# Patient Record
Sex: MALE | Race: ASIAN | Employment: FULL TIME | URBAN - METROPOLITAN AREA
[De-identification: names, ages, dates, MRNs, and addresses within clinical notes are randomized per-mention and may not be internally consistent; named-entity substitution may affect disease eponyms.]

---

## 2017-12-04 ENCOUNTER — APPOINTMENT (OUTPATIENT)
Dept: CV DIAGNOSTICS | Facility: HOSPITAL | Age: 57
DRG: 247 | End: 2017-12-04
Attending: HOSPITALIST
Payer: COMMERCIAL

## 2017-12-04 ENCOUNTER — HOSPITAL ENCOUNTER (INPATIENT)
Facility: HOSPITAL | Age: 57
LOS: 2 days | Discharge: HOME | DRG: 247 | End: 2017-12-06
Attending: EMERGENCY MEDICINE | Admitting: HOSPITALIST
Payer: COMMERCIAL

## 2017-12-04 ENCOUNTER — APPOINTMENT (OUTPATIENT)
Dept: INTERVENTIONAL RADIOLOGY/VASCULAR | Facility: HOSPITAL | Age: 57
DRG: 247 | End: 2017-12-04
Attending: INTERNAL MEDICINE
Payer: COMMERCIAL

## 2017-12-04 ENCOUNTER — APPOINTMENT (OUTPATIENT)
Dept: GENERAL RADIOLOGY | Facility: HOSPITAL | Age: 57
DRG: 247 | End: 2017-12-04
Attending: EMERGENCY MEDICINE
Payer: COMMERCIAL

## 2017-12-04 DIAGNOSIS — R77.8 ELEVATED TROPONIN: ICD-10-CM

## 2017-12-04 DIAGNOSIS — R07.9 ACUTE CHEST PAIN: Primary | ICD-10-CM

## 2017-12-04 PROBLEM — R79.89 ELEVATED TROPONIN: Status: ACTIVE | Noted: 2017-12-04

## 2017-12-04 PROBLEM — Z95.820 S/P ANGIOPLASTY WITH STENT: Status: ACTIVE | Noted: 2017-12-04

## 2017-12-04 PROCEDURE — 3E073KZ INTRODUCTION OF OTHER DIAGNOSTIC SUBSTANCE INTO CORONARY ARTERY, PERCUTANEOUS APPROACH: ICD-10-PCS | Performed by: INTERNAL MEDICINE

## 2017-12-04 PROCEDURE — 027034Z DILATION OF CORONARY ARTERY, ONE ARTERY WITH DRUG-ELUTING INTRALUMINAL DEVICE, PERCUTANEOUS APPROACH: ICD-10-PCS | Performed by: INTERNAL MEDICINE

## 2017-12-04 PROCEDURE — 93306 TTE W/DOPPLER COMPLETE: CPT | Performed by: HOSPITALIST

## 2017-12-04 PROCEDURE — 71010 XR CHEST AP PORTABLE  (CPT=71010): CPT | Performed by: EMERGENCY MEDICINE

## 2017-12-04 PROCEDURE — B211YZZ FLUOROSCOPY OF MULTIPLE CORONARY ARTERIES USING OTHER CONTRAST: ICD-10-PCS | Performed by: INTERNAL MEDICINE

## 2017-12-04 PROCEDURE — 99223 1ST HOSP IP/OBS HIGH 75: CPT | Performed by: HOSPITALIST

## 2017-12-04 PROCEDURE — B215YZZ FLUOROSCOPY OF LEFT HEART USING OTHER CONTRAST: ICD-10-PCS | Performed by: INTERNAL MEDICINE

## 2017-12-04 PROCEDURE — B240ZZ3 ULTRASONOGRAPHY OF SINGLE CORONARY ARTERY, INTRAVASCULAR: ICD-10-PCS | Performed by: INTERNAL MEDICINE

## 2017-12-04 PROCEDURE — 4A023N7 MEASUREMENT OF CARDIAC SAMPLING AND PRESSURE, LEFT HEART, PERCUTANEOUS APPROACH: ICD-10-PCS | Performed by: INTERNAL MEDICINE

## 2017-12-04 RX ORDER — ATORVASTATIN CALCIUM 10 MG/1
10 TABLET, FILM COATED ORAL NIGHTLY
COMMUNITY
End: 2017-12-06

## 2017-12-04 RX ORDER — ATORVASTATIN CALCIUM 10 MG/1
10 TABLET, FILM COATED ORAL NIGHTLY
Status: DISCONTINUED | OUTPATIENT
Start: 2017-12-04 | End: 2017-12-04

## 2017-12-04 RX ORDER — SODIUM CHLORIDE 9 MG/ML
INJECTION, SOLUTION INTRAVENOUS CONTINUOUS
Status: DISCONTINUED | OUTPATIENT
Start: 2017-12-04 | End: 2017-12-04

## 2017-12-04 RX ORDER — NITROGLYCERIN 0.4 MG/1
0.4 TABLET SUBLINGUAL ONCE
Status: COMPLETED | OUTPATIENT
Start: 2017-12-04 | End: 2017-12-04

## 2017-12-04 RX ORDER — HEPARIN SODIUM 5000 [USP'U]/ML
INJECTION, SOLUTION INTRAVENOUS; SUBCUTANEOUS
Status: COMPLETED
Start: 2017-12-04 | End: 2017-12-04

## 2017-12-04 RX ORDER — ACETAMINOPHEN 500 MG
1000 TABLET ORAL EVERY 6 HOURS PRN
Status: DISCONTINUED | OUTPATIENT
Start: 2017-12-04 | End: 2017-12-06

## 2017-12-04 RX ORDER — OMEPRAZOLE 20 MG/1
20 CAPSULE, DELAYED RELEASE ORAL
COMMUNITY
End: 2018-01-02

## 2017-12-04 RX ORDER — LIDOCAINE HYDROCHLORIDE 10 MG/ML
INJECTION, SOLUTION INFILTRATION; PERINEURAL
Status: COMPLETED
Start: 2017-12-04 | End: 2017-12-04

## 2017-12-04 RX ORDER — MIDAZOLAM HYDROCHLORIDE 1 MG/ML
INJECTION INTRAMUSCULAR; INTRAVENOUS
Status: COMPLETED
Start: 2017-12-04 | End: 2017-12-04

## 2017-12-04 RX ORDER — ASPIRIN 325 MG
325 TABLET ORAL DAILY
Status: DISCONTINUED | OUTPATIENT
Start: 2017-12-04 | End: 2017-12-04

## 2017-12-04 RX ORDER — OLMESARTAN MEDOXOMIL 20 MG/1
5 TABLET ORAL
COMMUNITY
End: 2017-12-06

## 2017-12-04 RX ORDER — NITROGLYCERIN 0.4 MG/1
0.4 TABLET SUBLINGUAL EVERY 5 MIN PRN
Status: DISCONTINUED | OUTPATIENT
Start: 2017-12-04 | End: 2017-12-06

## 2017-12-04 RX ORDER — ACETAMINOPHEN AND CODEINE PHOSPHATE 300; 30 MG/1; MG/1
2 TABLET ORAL EVERY 4 HOURS PRN
Status: DISCONTINUED | OUTPATIENT
Start: 2017-12-04 | End: 2017-12-06

## 2017-12-04 RX ORDER — FENOFIBRATE 134 MG/1
134 CAPSULE ORAL
Status: DISCONTINUED | OUTPATIENT
Start: 2017-12-04 | End: 2017-12-06

## 2017-12-04 RX ORDER — ASPIRIN 81 MG/1
324 TABLET, CHEWABLE ORAL ONCE
Status: COMPLETED | OUTPATIENT
Start: 2017-12-04 | End: 2017-12-04

## 2017-12-04 RX ORDER — ACETAMINOPHEN AND CODEINE PHOSPHATE 300; 30 MG/1; MG/1
1 TABLET ORAL EVERY 4 HOURS PRN
Status: DISCONTINUED | OUTPATIENT
Start: 2017-12-04 | End: 2017-12-06

## 2017-12-04 RX ORDER — HEPARIN SODIUM AND DEXTROSE 10000; 5 [USP'U]/100ML; G/100ML
INJECTION INTRAVENOUS CONTINUOUS
Status: DISCONTINUED | OUTPATIENT
Start: 2017-12-04 | End: 2017-12-04

## 2017-12-04 RX ORDER — SODIUM CHLORIDE 9 MG/ML
INJECTION, SOLUTION INTRAVENOUS CONTINUOUS
Status: ACTIVE | OUTPATIENT
Start: 2017-12-04 | End: 2017-12-05

## 2017-12-04 RX ORDER — HEPARIN SODIUM 5000 [USP'U]/ML
30 INJECTION INTRAVENOUS; SUBCUTANEOUS ONCE
Status: COMPLETED | OUTPATIENT
Start: 2017-12-04 | End: 2017-12-04

## 2017-12-04 RX ORDER — HEPARIN SODIUM 5000 [USP'U]/ML
60 INJECTION INTRAVENOUS; SUBCUTANEOUS ONCE
Status: COMPLETED | OUTPATIENT
Start: 2017-12-04 | End: 2017-12-04

## 2017-12-04 RX ORDER — HEPARIN SODIUM AND DEXTROSE 10000; 5 [USP'U]/100ML; G/100ML
12 INJECTION INTRAVENOUS ONCE
Status: COMPLETED | OUTPATIENT
Start: 2017-12-04 | End: 2017-12-04

## 2017-12-04 RX ORDER — ACETAMINOPHEN 325 MG/1
650 TABLET ORAL EVERY 4 HOURS PRN
Status: DISCONTINUED | OUTPATIENT
Start: 2017-12-04 | End: 2017-12-06

## 2017-12-04 RX ORDER — PANTOPRAZOLE SODIUM 20 MG/1
20 TABLET, DELAYED RELEASE ORAL
Status: DISCONTINUED | OUTPATIENT
Start: 2017-12-04 | End: 2017-12-06

## 2017-12-04 RX ORDER — ASPIRIN 81 MG/1
324 TABLET, CHEWABLE ORAL ONCE
Status: DISCONTINUED | OUTPATIENT
Start: 2017-12-04 | End: 2017-12-04

## 2017-12-04 RX ORDER — ONDANSETRON 2 MG/ML
4 INJECTION INTRAMUSCULAR; INTRAVENOUS EVERY 4 HOURS PRN
Status: DISCONTINUED | OUTPATIENT
Start: 2017-12-04 | End: 2017-12-06

## 2017-12-04 RX ORDER — ATORVASTATIN CALCIUM 20 MG/1
20 TABLET, FILM COATED ORAL NIGHTLY
Status: DISCONTINUED | OUTPATIENT
Start: 2017-12-04 | End: 2017-12-06

## 2017-12-04 RX ORDER — LOSARTAN POTASSIUM 25 MG/1
25 TABLET ORAL DAILY
Status: DISCONTINUED | OUTPATIENT
Start: 2017-12-04 | End: 2017-12-06

## 2017-12-04 RX ORDER — ASPIRIN 81 MG/1
81 TABLET ORAL DAILY
Status: DISCONTINUED | OUTPATIENT
Start: 2017-12-05 | End: 2017-12-06

## 2017-12-04 RX ORDER — DEXTROSE MONOHYDRATE 25 G/50ML
50 INJECTION, SOLUTION INTRAVENOUS
Status: DISCONTINUED | OUTPATIENT
Start: 2017-12-04 | End: 2017-12-06

## 2017-12-04 RX ORDER — POTASSIUM CHLORIDE 20 MEQ/1
40 TABLET, EXTENDED RELEASE ORAL ONCE
Status: COMPLETED | OUTPATIENT
Start: 2017-12-04 | End: 2017-12-04

## 2017-12-04 NOTE — CONSULTS
[facility]    Report of Consultation    Stephentownrobyn Patiño Patient Status:  Inpatient    1960 MRN [id number]   [facility] 2NE-A Attending Freeman Gross MD   Hosp Day # 0 PCP Triny Ibarra MD     Date of Admission:  2017  Elder 200-3,000 Units/hr, Intravenous, Continuous  •  metoprolol tartrate (LOPRESSOR) partial tablet 12.5 mg, 12.5 mg, Oral, 2x Daily(Beta Blocker)  •  atorvastatin (LIPITOR) tab 10 mg, 10 mg, Oral, Nightly  •  fenofibrate micronized (LOFIBRA) cap 134 mg, 134 mg kg)  12/22/16 : 145 lb (65.8 kg)      Telemetry: sinus  General: Alert and oriented in no apparent distress. HEENT: No focal deficits. Neck: No JVD  Cardiac: Regular rate and rhythm, S1, S2 normal, no murmur, rub or gallop.   Lungs: Clear without wheezes,

## 2017-12-04 NOTE — PROGRESS NOTES
IM addendum to Dr. Caitlin Vera H&P:    Pt seen and examined. No acute events, denies chest pain, dyspnea.     /77 (BP Location: Right arm)   Pulse 65   Temp 98 °F (36.7 °C) (Oral)   Resp 23   Ht 5' 6\" (1.676 m)   Wt 153 lb 3.5 oz (69.5 kg)   SpO2 100%

## 2017-12-04 NOTE — H&P
[facility]  History and Physical     Celia Emerson Patient Status:  Emergency    1960 MRN [id number]   Location [address] [facility] Attending Rosette Mckeon MD   Hosp Day # 0 PCP Marilyn Argueta MD     Chief Complaint: Kiarajuan david Kaye Choline Fenofibrate (TRILIPIX) 135 MG Oral Capsule Delayed Release 1 CAPSULE DAILY Disp:  Rfl:    Esomeprazole Magnesium (NEXIUM) 20 MG Oral Capsule Delayed Release 1 CAPSULE DAILY Disp:  Rfl:    Rosuvastatin Calcium (CRESTOR) 10 MG Oral Tab 1 TABLET SHOSHANA bb  2. HTN  1. ARB  2. Start BB  3. DM II- Hyperglycemia protocol  4. DL-statin  5. GERD-PPI  6. CKD-suspect secondary to diabetes and hypertension  1. Monitor closely  2.  Gentle hydration prior to angiogram    Quality:  · DVT Prophylaxis: Heparin drip  ·

## 2017-12-04 NOTE — ED PROVIDER NOTES
Patient Seen in: [facility] Emergency Department    History   Patient presents with:  Chest Pain Angina (cardiovascular)    Stated Complaint: chest pain    HPI    44-year-old male comes in the hospital the chief complaint of having difficulty with p or masses noted  Back nontender without CVA tenderness  Extremity no clubbing, cyanosis or edema noted.   Full range of motion noted without tenderness  Neuro: No focal deficits noted    ED Course     Labs Reviewed   COMP METABOLIC PANEL (14) - Abnormal; No unstable angina patient be admitted for further care at this time.   ED Course as of Dec 04 0413  ------------------------------------------------------------       [facility]           Admission disposition: 12/4/2017  4:13 AM           Disposition and Plan     C

## 2017-12-04 NOTE — PLAN OF CARE
NURSING ADMISSION NOTE      Patient admitted via Cart  Oriented to room. Safety precautions initiated. Bed in low position. Call light in reach. Pt admitted from ER with chest pain. Wife at bedside. A&Ox4. Room air.  NSR on tele, denying cardiovas

## 2017-12-04 NOTE — PAYOR COMM NOTE
--------------  ADMISSION REVIEW     Payor: [employer] PPO  Subscriber #:  [id number]  Authorization Number: [id number]    Admit date: 12/4/17  Admit time: 2968       Admitting Physician: Dione Akins MD  Attending Physician:  Dione Akins MD  Primary Care Phys Temp 97.9 °F (36.6 °C) (Temporal)   Resp 15   Ht 167.6 cm (5' 6\")   Wt 68 kg   SpO2 96%   BMI 24.21 kg/m²[MP.2]     Physical Exam  HEENT : NCAT, EOMI, PEERL, throat clear, neck supple, no JVD, trachea midline, No LAD  Heart: S1S2 normal. No murmurs, regul troponin[MP. 2]    Disposition:[MP.1]  Admit  12/4/2017  4:13 am[MP. 2]    Present on Admission  Date Reviewed: 12/6/2013          ICD-10-CM Noted POA    Acute chest pain R07.9 12/4/2017 Unknown[MP.2]        Ubaldo Merino MD on 12/4/2017  4:14 AM      H&P medications on file prior to encounter.    Current Outpatient Prescriptions on File Prior to Encounter:  Aspirin 81 MG Oral Tab 1 TABLET DAILY Disp:  Rfl:    Choline Fenofibrate (TRILIPIX) 135 MG Oral Capsule Delayed Release 1 CAPSULE DAILY Disp:  Rfl:    E troponin >1[MD.1]. Currently chest pain has subsided[MD.2]  1. Heparin drip  2. Cardio consult[MD.1]  3. Check subsequent troponin[MD.3]  4. ECHO  5. ASA[MD.1], nitro, bb[MD.2]  2. HTN  1. ARB  2. Start BB  3. DM II- Hyperglycemia protocol  4.  DL-statin micronized (LOFIBRA) cap 134 mg     Date Action Dose Route User    12/4/2017 0830 Given 134 mg Oral Nat Mendenhall, RN      Losartan Potassium (COZAAR) tab 25 mg     Date Action Dose Route User    12/4/2017 0621 Given 25 mg Oral Jolene Mobley RN      met

## 2017-12-04 NOTE — PROGRESS NOTES
12/04/17 0544 12/04/17 0546 12/04/17 0547   Vital Signs   /80 118/99 127/72   BP Location Right arm Right arm Right arm   BP Method Automatic Automatic Automatic   Patient Position Lying Sitting Standing     Orthostatic BP negative

## 2017-12-04 NOTE — RESPIRATORY THERAPY NOTE
LUCAS - Equipment Use Daily Summary:                  . Set Mode:                . Usage in hours:                . 90% Pressure (EPAP) level:                . 90% Insp. Pressure (IPAP): Antonio Solis AHI:                .  Supplemental Oxygen:

## 2017-12-04 NOTE — PLAN OF CARE
I contacted Mr. Diallo's PCP, Dr. Rolle Pi up as requested per RN and updated him on plan for cath today. Will update him post cath.     Phone Number: [phone]    Frederick Vines NP   12/4/2017  12:15 PM

## 2017-12-04 NOTE — ED INITIAL ASSESSMENT (HPI)
Pt complaining chest pain that radiates to back. Pt. Describe as chest pressure. States it started yesterday after moving a mattress. States did not feel well for about 15-30 minutes after moving the mattress.  Went for a 2 mile walk and states he was doing

## 2017-12-05 ENCOUNTER — PRIOR ORIGINAL RECORDS (OUTPATIENT)
Dept: OTHER | Age: 57
End: 2017-12-05

## 2017-12-05 PROCEDURE — 99233 SBSQ HOSP IP/OBS HIGH 50: CPT | Performed by: HOSPITALIST

## 2017-12-05 RX ORDER — ALFUZOSIN HYDROCHLORIDE 10 MG/1
10 TABLET, EXTENDED RELEASE ORAL
Status: DISCONTINUED | OUTPATIENT
Start: 2017-12-06 | End: 2017-12-06

## 2017-12-05 NOTE — DIETARY NOTE
Nutrition Short Note  Dietitian consult received per cardiac rehab/CHF protocol. Pt to be educated by cardiac rehab staff and encouraged to attend outpatient classes taught by RACHEL. RD available PRN.     Sanford Dia  Dietetic Intern

## 2017-12-05 NOTE — PROCEDURES
[facility]    PATIENT'S NAME: Maria D KAUR   ATTENDING PHYSICIAN: Lukasz Loomis M.D. OPERATING PHYSICIAN: Fadumo Bass M.D.    PATIENT ACCOUNT#:   [de-identified]    LOCATION:  [address] [place] [address]  MEDICAL RECORD #:   [id number]       DATE OF BIRTH:  [birth year] was 55% to 60%. No significant mitral insufficiency was noted. No gradient was present across the aortic valve. CORONARY ARTERIOGRAPHY:  Coronary circulation was codominant, and a mild amount of vascular calcification was seen fluoroscopically.     Lef single-vessel coronary disease with preserved overall left ventricular systolic function. He underwent successful angioplasty and stenting of the proximal left anterior descending coronary artery as described above.     The patient now has a drug-eluting s

## 2017-12-05 NOTE — PAYOR COMM NOTE
--------------  CONTINUED STAY REVIEW    Payor: [facility] PPO  Subscriber #:  [id number]  Authorization Number: [id number]    Admit date: 12/4/17  Admit time: [address]     Admitting Physician: Karely Pat MD  Attending Physician:  Karely Pat MD  Primary C 12/4/2017 2139 Given 40 mEq Oral River Cancino RN      0.9%  NaCl infusion    100cc/h    Date Action Dose Route User    12/4/2017 6200 N Maria Victoria Giordano (none) Intravenous Gurpreet Clinton RN    12/4/2017 2549 Rate/Dose Change (none) Intravenous Nat Mendenhall

## 2017-12-05 NOTE — PROGRESS NOTES
[facility]  Progress Note     Rudean Notice Patient Status:  Inpatient    1960 MRN [id number]   [facility] 2NE-A Attending Bird Reyna MD   Hosp Day # 1 PCP Isreal Eden MD     Chief Complaint: Chest pain    S: Patient had 12.5 mg Oral 2x Daily(Beta Blocker)   • fenofibrate micronized  134 mg Oral Daily with breakfast   • Pantoprazole Sodium  20 mg Oral QAM AC   • Losartan Potassium  25 mg Oral Daily   • Insulin Aspart Pen  1-68 Units Subcutaneous TID CC   • Insulin Aspart

## 2017-12-05 NOTE — RESPIRATORY THERAPY NOTE
LUCAS Equipment Usage Summary :            Set Mode :AUTO CPAP W FLEX          Usage in Hours:7;54          90% Pressure (EPAP) :  20          90% Insp Pressure (IPAP);           AHI : 28.1          Supplemental Oxygen :      LPM

## 2017-12-05 NOTE — PROGRESS NOTES
[facility]  Cardiology Progress Note    Ami Footman Patient Status:  Inpatient    1960 MRN [id number]   [facility] 2NE-A Attending Radha Hood MD   Hosp Day # 1 PCP Lilibeth Brothers MD     Subjective:  No complaints of chest pa statin  2. Cardiac rehab   3. Discharge planning. TESS Mcdonough  12/5/2017  7:29 AM    Seen and examined. Discussed with patient and his wife in detail. Urinary retention overnight requiring straight cath -- looks and feels well. No chest pain.

## 2017-12-06 VITALS
RESPIRATION RATE: 18 BRPM | HEIGHT: 66 IN | SYSTOLIC BLOOD PRESSURE: 106 MMHG | BODY MASS INDEX: 24.63 KG/M2 | TEMPERATURE: 99 F | HEART RATE: 96 BPM | DIASTOLIC BLOOD PRESSURE: 66 MMHG | OXYGEN SATURATION: 91 % | WEIGHT: 153.25 LBS

## 2017-12-06 PROCEDURE — 99239 HOSP IP/OBS DSCHRG MGMT >30: CPT | Performed by: INTERNAL MEDICINE

## 2017-12-06 RX ORDER — ALFUZOSIN HYDROCHLORIDE 10 MG/1
10 TABLET, EXTENDED RELEASE ORAL
Status: DISCONTINUED | OUTPATIENT
Start: 2017-12-06 | End: 2017-12-06

## 2017-12-06 RX ORDER — METOPROLOL SUCCINATE 50 MG/1
50 TABLET, EXTENDED RELEASE ORAL
Status: DISCONTINUED | OUTPATIENT
Start: 2017-12-07 | End: 2017-12-06

## 2017-12-06 RX ORDER — METOPROLOL SUCCINATE 50 MG/1
50 TABLET, EXTENDED RELEASE ORAL
Qty: 30 TABLET | Refills: 6 | Status: SHIPPED | OUTPATIENT
Start: 2017-12-07

## 2017-12-06 RX ORDER — ATORVASTATIN CALCIUM 20 MG/1
20 TABLET, FILM COATED ORAL NIGHTLY
Qty: 30 TABLET | Refills: 6 | Status: SHIPPED | OUTPATIENT
Start: 2017-12-06

## 2017-12-06 RX ORDER — LOSARTAN POTASSIUM 25 MG/1
25 TABLET ORAL NIGHTLY
Status: DISCONTINUED | OUTPATIENT
Start: 2017-12-06 | End: 2017-12-06

## 2017-12-06 RX ORDER — LOSARTAN POTASSIUM 25 MG/1
25 TABLET ORAL NIGHTLY
Qty: 30 TABLET | Refills: 6 | Status: SHIPPED | OUTPATIENT
Start: 2017-12-06

## 2017-12-06 NOTE — PROGRESS NOTES
Doing well. Able to urinate okay -- had prior urinary obstruction after prostate biopsy - was on Flomax for some time but stopped secondary to nasal congestion and cough.     Afebrile  106/66  92 regular    Lungs clear  Ht RRR  abd soft  Ext no bleeding fro

## 2017-12-06 NOTE — DISCHARGE SUMMARY
[facility] [place] [facility]  DISCHARGE SUMMARY     Celia Emerson Patient Status:  Inpatient    1960 MRN [id number]   [facility] 2NE-A Attending Elliott Renae MD   Hosp Day # 2 PCP Marilyn Argueta MD     Date of Admission: 2017  Date of Disc hematuria. Brief Synopsis:     Patient admitted due to NSTEMI. He underwent cardiac angiogram and JUDD placed in LAD. He developed some urinary retention post procedure which has resolved.  He will be discharged home today and follow up closely as outpati Refills:  0     omeprazole 20 MG Cpdr  Commonly known as:  PRILOSEC      Take 20 mg by mouth every morning before breakfast.   Refills:  0     TYLENOL WITH CODEINE #3 300-30 MG Tabs  Generic drug:  Acetaminophen-Codeine      1 OR 2 TABLETS EVERY 4 TO 6 CHARI 12/6/2017    Time spent:  > 30 minutes

## 2017-12-06 NOTE — PLAN OF CARE
Patient discharged home  Iv and tele removed  Reviewed discharge instructions with patient who verbalized understanding  To lobby via wheelchair and transport

## 2017-12-06 NOTE — PLAN OF CARE
CARDIOVASCULAR - ADULT    • Maintains optimal cardiac output and hemodynamic stability Progressing    • Absence of cardiac arrhythmias or at baseline Progressing          Pt axox4. Denies chest pain or sob. Sinus Rhythm on tele. Right groin soft, PAYAL.  Peda

## 2017-12-06 NOTE — RESPIRATORY THERAPY NOTE
LUCAS - Equipment Use Daily Summary:                  . Set Mode:AUTO CPAP WITH C-FLEX                . Usage in hours:4:49                . 90% Pressure (EPAP) level:7.2                . 90% Insp. Pressure (IPAP): Spruce Master  AHI:4.4                . Khalil

## 2017-12-08 ENCOUNTER — TELEPHONE (OUTPATIENT)
Dept: CARDIAC REHAB | Facility: HOSPITAL | Age: 57
End: 2017-12-08

## 2017-12-18 ENCOUNTER — PRIOR ORIGINAL RECORDS (OUTPATIENT)
Dept: OTHER | Age: 57
End: 2017-12-18

## 2017-12-22 ENCOUNTER — HOSPITAL ENCOUNTER (OUTPATIENT)
Dept: CV DIAGNOSTICS | Facility: HOSPITAL | Age: 57
Discharge: HOME | End: 2017-12-22
Attending: INTERNAL MEDICINE
Payer: COMMERCIAL

## 2017-12-22 DIAGNOSIS — I21.9 ACUTE MI (HCC): ICD-10-CM

## 2017-12-22 DIAGNOSIS — I25.10 CAD (CORONARY ARTERY DISEASE): ICD-10-CM

## 2017-12-22 DIAGNOSIS — E11.9 DM (DIABETES MELLITUS) (HCC): ICD-10-CM

## 2017-12-22 PROCEDURE — 93018 CV STRESS TEST I&R ONLY: CPT | Performed by: INTERNAL MEDICINE

## 2017-12-22 PROCEDURE — 93017 CV STRESS TEST TRACING ONLY: CPT | Performed by: INTERNAL MEDICINE

## 2017-12-26 ENCOUNTER — PRIOR ORIGINAL RECORDS (OUTPATIENT)
Dept: OTHER | Age: 57
End: 2017-12-26

## 2018-01-02 ENCOUNTER — CARDPULM VISIT (OUTPATIENT)
Dept: CARDIAC REHAB | Facility: HOSPITAL | Age: 58
End: 2018-01-02
Attending: INTERNAL MEDICINE
Payer: COMMERCIAL

## 2018-01-02 VITALS — WEIGHT: 147 LBS | HEIGHT: 66 IN | BODY MASS INDEX: 23.63 KG/M2

## 2018-01-02 DIAGNOSIS — Z95.5 STENTED CORONARY ARTERY: Primary | ICD-10-CM

## 2018-01-05 LAB
BUN: 14 MG/DL
CALCIUM: 8.9 MG/DL
CHLORIDE: 109 MEQ/L
CHOLESTEROL, TOTAL: 125 MG/DL
CREATININE, SERUM: 1.15 MG/DL
GLUCOSE: 127 MG/DL
HDL CHOLESTEROL: 28 MG/DL
HEMATOCRIT: 41.2 %
HEMOGLOBIN A1C: 7.3 %
HEMOGLOBIN: 13.2 G/DL
LDL CHOLESTEROL: 71 MG/DL
PLATELETS: 244 K/UL
POTASSIUM, SERUM: 4.3 MEQ/L
RED BLOOD COUNT: 4.83 X 10-6/U
SODIUM: 140 MEQ/L
TRIGLYCERIDES: 130 MG/DL
WHITE BLOOD COUNT: 8.3 X 10-3/U

## 2018-01-08 ENCOUNTER — CARDPULM VISIT (OUTPATIENT)
Dept: CARDIAC REHAB | Facility: HOSPITAL | Age: 58
End: 2018-01-08
Attending: INTERNAL MEDICINE
Payer: COMMERCIAL

## 2018-01-08 PROCEDURE — 93798 PHYS/QHP OP CAR RHAB W/ECG: CPT

## 2018-01-10 ENCOUNTER — APPOINTMENT (OUTPATIENT)
Dept: CARDIAC REHAB | Facility: HOSPITAL | Age: 58
End: 2018-01-10
Attending: INTERNAL MEDICINE
Payer: COMMERCIAL

## 2018-01-11 ENCOUNTER — CARDPULM VISIT (OUTPATIENT)
Dept: CARDIAC REHAB | Facility: HOSPITAL | Age: 58
End: 2018-01-11
Attending: INTERNAL MEDICINE
Payer: COMMERCIAL

## 2018-01-11 PROCEDURE — 93798 PHYS/QHP OP CAR RHAB W/ECG: CPT

## 2018-01-12 ENCOUNTER — CARDPULM VISIT (OUTPATIENT)
Dept: CARDIAC REHAB | Facility: HOSPITAL | Age: 58
End: 2018-01-12
Attending: INTERNAL MEDICINE
Payer: COMMERCIAL

## 2018-01-12 PROCEDURE — 93798 PHYS/QHP OP CAR RHAB W/ECG: CPT

## 2018-01-15 ENCOUNTER — CARDPULM VISIT (OUTPATIENT)
Dept: CARDIAC REHAB | Facility: HOSPITAL | Age: 58
End: 2018-01-15
Attending: INTERNAL MEDICINE
Payer: COMMERCIAL

## 2018-01-15 PROCEDURE — 93798 PHYS/QHP OP CAR RHAB W/ECG: CPT

## 2018-01-17 ENCOUNTER — APPOINTMENT (OUTPATIENT)
Dept: CARDIAC REHAB | Facility: HOSPITAL | Age: 58
End: 2018-01-17
Attending: INTERNAL MEDICINE
Payer: COMMERCIAL

## 2018-01-18 ENCOUNTER — CARDPULM VISIT (OUTPATIENT)
Dept: CARDIAC REHAB | Facility: HOSPITAL | Age: 58
End: 2018-01-18
Attending: INTERNAL MEDICINE
Payer: COMMERCIAL

## 2018-01-18 PROCEDURE — 93798 PHYS/QHP OP CAR RHAB W/ECG: CPT

## 2018-01-19 ENCOUNTER — CARDPULM VISIT (OUTPATIENT)
Dept: CARDIAC REHAB | Facility: HOSPITAL | Age: 58
End: 2018-01-19
Attending: INTERNAL MEDICINE
Payer: COMMERCIAL

## 2018-01-19 PROCEDURE — 93798 PHYS/QHP OP CAR RHAB W/ECG: CPT

## 2018-01-22 ENCOUNTER — APPOINTMENT (OUTPATIENT)
Dept: CARDIAC REHAB | Facility: HOSPITAL | Age: 58
End: 2018-01-22
Attending: INTERNAL MEDICINE
Payer: COMMERCIAL

## 2018-01-24 ENCOUNTER — CARDPULM VISIT (OUTPATIENT)
Dept: CARDIAC REHAB | Facility: HOSPITAL | Age: 58
End: 2018-01-24
Attending: INTERNAL MEDICINE
Payer: COMMERCIAL

## 2018-01-24 PROCEDURE — 93798 PHYS/QHP OP CAR RHAB W/ECG: CPT

## 2018-01-25 ENCOUNTER — CARDPULM VISIT (OUTPATIENT)
Dept: CARDIAC REHAB | Facility: HOSPITAL | Age: 58
End: 2018-01-25
Attending: INTERNAL MEDICINE
Payer: COMMERCIAL

## 2018-01-25 PROCEDURE — 93798 PHYS/QHP OP CAR RHAB W/ECG: CPT

## 2018-01-26 ENCOUNTER — CARDPULM VISIT (OUTPATIENT)
Dept: CARDIAC REHAB | Facility: HOSPITAL | Age: 58
End: 2018-01-26
Attending: INTERNAL MEDICINE
Payer: COMMERCIAL

## 2018-01-26 PROCEDURE — 93798 PHYS/QHP OP CAR RHAB W/ECG: CPT

## 2018-01-29 ENCOUNTER — CARDPULM VISIT (OUTPATIENT)
Dept: CARDIAC REHAB | Facility: HOSPITAL | Age: 58
End: 2018-01-29
Attending: INTERNAL MEDICINE
Payer: COMMERCIAL

## 2018-01-29 PROCEDURE — 93798 PHYS/QHP OP CAR RHAB W/ECG: CPT

## 2018-01-31 ENCOUNTER — APPOINTMENT (OUTPATIENT)
Dept: CARDIAC REHAB | Facility: HOSPITAL | Age: 58
End: 2018-01-31
Attending: INTERNAL MEDICINE
Payer: COMMERCIAL

## 2018-01-31 PROCEDURE — 93798 PHYS/QHP OP CAR RHAB W/ECG: CPT

## 2018-02-01 ENCOUNTER — APPOINTMENT (OUTPATIENT)
Dept: CARDIAC REHAB | Facility: HOSPITAL | Age: 58
End: 2018-02-01
Attending: INTERNAL MEDICINE
Payer: COMMERCIAL

## 2018-02-02 ENCOUNTER — APPOINTMENT (OUTPATIENT)
Dept: CARDIAC REHAB | Facility: HOSPITAL | Age: 58
End: 2018-02-02
Attending: INTERNAL MEDICINE
Payer: COMMERCIAL

## 2018-02-07 ENCOUNTER — APPOINTMENT (OUTPATIENT)
Dept: CARDIAC REHAB | Facility: HOSPITAL | Age: 58
End: 2018-02-07
Attending: INTERNAL MEDICINE
Payer: COMMERCIAL

## 2018-02-07 PROCEDURE — 93798 PHYS/QHP OP CAR RHAB W/ECG: CPT

## 2018-02-08 ENCOUNTER — CARDPULM VISIT (OUTPATIENT)
Dept: CARDIAC REHAB | Facility: HOSPITAL | Age: 58
End: 2018-02-08
Attending: INTERNAL MEDICINE
Payer: COMMERCIAL

## 2018-02-08 PROCEDURE — 93798 PHYS/QHP OP CAR RHAB W/ECG: CPT

## 2018-02-12 ENCOUNTER — CARDPULM VISIT (OUTPATIENT)
Dept: CARDIAC REHAB | Facility: HOSPITAL | Age: 58
End: 2018-02-12
Attending: INTERNAL MEDICINE
Payer: COMMERCIAL

## 2018-02-12 PROCEDURE — 93798 PHYS/QHP OP CAR RHAB W/ECG: CPT

## 2018-02-14 ENCOUNTER — APPOINTMENT (OUTPATIENT)
Dept: CARDIAC REHAB | Facility: HOSPITAL | Age: 58
End: 2018-02-14
Attending: INTERNAL MEDICINE
Payer: COMMERCIAL

## 2018-02-15 ENCOUNTER — CARDPULM VISIT (OUTPATIENT)
Dept: CARDIAC REHAB | Facility: HOSPITAL | Age: 58
End: 2018-02-15
Attending: INTERNAL MEDICINE
Payer: COMMERCIAL

## 2018-02-15 PROCEDURE — 93798 PHYS/QHP OP CAR RHAB W/ECG: CPT

## 2018-02-15 RX ORDER — PANTOPRAZOLE SODIUM 40 MG/1
40 TABLET, DELAYED RELEASE ORAL DAILY
COMMUNITY

## 2018-02-16 ENCOUNTER — CARDPULM VISIT (OUTPATIENT)
Dept: CARDIAC REHAB | Facility: HOSPITAL | Age: 58
End: 2018-02-16
Attending: INTERNAL MEDICINE
Payer: COMMERCIAL

## 2018-02-16 PROCEDURE — 93798 PHYS/QHP OP CAR RHAB W/ECG: CPT

## 2018-02-19 ENCOUNTER — CARDPULM VISIT (OUTPATIENT)
Dept: CARDIAC REHAB | Facility: HOSPITAL | Age: 58
End: 2018-02-19
Attending: INTERNAL MEDICINE
Payer: COMMERCIAL

## 2018-02-19 PROCEDURE — 93798 PHYS/QHP OP CAR RHAB W/ECG: CPT

## 2018-02-20 ENCOUNTER — PRIOR ORIGINAL RECORDS (OUTPATIENT)
Dept: OTHER | Age: 58
End: 2018-02-20

## 2018-02-21 ENCOUNTER — APPOINTMENT (OUTPATIENT)
Dept: CARDIAC REHAB | Facility: HOSPITAL | Age: 58
End: 2018-02-21
Attending: INTERNAL MEDICINE
Payer: COMMERCIAL

## 2018-02-26 ENCOUNTER — CARDPULM VISIT (OUTPATIENT)
Dept: CARDIAC REHAB | Facility: HOSPITAL | Age: 58
End: 2018-02-26
Attending: INTERNAL MEDICINE
Payer: COMMERCIAL

## 2018-02-26 PROCEDURE — 93798 PHYS/QHP OP CAR RHAB W/ECG: CPT

## 2018-02-28 ENCOUNTER — APPOINTMENT (OUTPATIENT)
Dept: CARDIAC REHAB | Facility: HOSPITAL | Age: 58
End: 2018-02-28
Attending: INTERNAL MEDICINE
Payer: COMMERCIAL

## 2018-03-01 ENCOUNTER — CARDPULM VISIT (OUTPATIENT)
Dept: CARDIAC REHAB | Facility: HOSPITAL | Age: 58
End: 2018-03-01
Attending: INTERNAL MEDICINE
Payer: COMMERCIAL

## 2018-03-01 PROCEDURE — 93798 PHYS/QHP OP CAR RHAB W/ECG: CPT

## 2018-03-05 ENCOUNTER — CARDPULM VISIT (OUTPATIENT)
Dept: CARDIAC REHAB | Facility: HOSPITAL | Age: 58
End: 2018-03-05
Attending: INTERNAL MEDICINE
Payer: COMMERCIAL

## 2018-03-05 PROCEDURE — 93798 PHYS/QHP OP CAR RHAB W/ECG: CPT

## 2018-03-07 ENCOUNTER — APPOINTMENT (OUTPATIENT)
Dept: CARDIAC REHAB | Facility: HOSPITAL | Age: 58
End: 2018-03-07
Attending: INTERNAL MEDICINE
Payer: COMMERCIAL

## 2018-03-08 ENCOUNTER — APPOINTMENT (OUTPATIENT)
Dept: CARDIAC REHAB | Facility: HOSPITAL | Age: 58
End: 2018-03-08
Attending: INTERNAL MEDICINE
Payer: COMMERCIAL

## 2018-03-09 ENCOUNTER — CARDPULM VISIT (OUTPATIENT)
Dept: CARDIAC REHAB | Facility: HOSPITAL | Age: 58
End: 2018-03-09
Attending: INTERNAL MEDICINE
Payer: COMMERCIAL

## 2018-03-09 PROCEDURE — 93798 PHYS/QHP OP CAR RHAB W/ECG: CPT

## 2018-03-12 ENCOUNTER — CARDPULM VISIT (OUTPATIENT)
Dept: CARDIAC REHAB | Facility: HOSPITAL | Age: 58
End: 2018-03-12
Attending: INTERNAL MEDICINE
Payer: COMMERCIAL

## 2018-03-12 PROCEDURE — 93798 PHYS/QHP OP CAR RHAB W/ECG: CPT

## 2018-03-14 ENCOUNTER — APPOINTMENT (OUTPATIENT)
Dept: CARDIAC REHAB | Facility: HOSPITAL | Age: 58
End: 2018-03-14
Attending: INTERNAL MEDICINE
Payer: COMMERCIAL

## 2018-03-15 ENCOUNTER — CARDPULM VISIT (OUTPATIENT)
Dept: CARDIAC REHAB | Facility: HOSPITAL | Age: 58
End: 2018-03-15
Attending: INTERNAL MEDICINE
Payer: COMMERCIAL

## 2018-03-15 PROCEDURE — 93798 PHYS/QHP OP CAR RHAB W/ECG: CPT

## 2018-03-19 ENCOUNTER — CARDPULM VISIT (OUTPATIENT)
Dept: CARDIAC REHAB | Facility: HOSPITAL | Age: 58
End: 2018-03-19
Attending: INTERNAL MEDICINE
Payer: COMMERCIAL

## 2018-03-19 PROCEDURE — 93798 PHYS/QHP OP CAR RHAB W/ECG: CPT

## 2018-03-21 ENCOUNTER — APPOINTMENT (OUTPATIENT)
Dept: CARDIAC REHAB | Facility: HOSPITAL | Age: 58
End: 2018-03-21
Attending: INTERNAL MEDICINE
Payer: COMMERCIAL

## 2018-03-28 ENCOUNTER — APPOINTMENT (OUTPATIENT)
Dept: CARDIAC REHAB | Facility: HOSPITAL | Age: 58
End: 2018-03-28
Attending: INTERNAL MEDICINE
Payer: COMMERCIAL

## 2018-03-30 ENCOUNTER — CARDPULM VISIT (OUTPATIENT)
Dept: CARDIAC REHAB | Facility: HOSPITAL | Age: 58
End: 2018-03-30
Attending: INTERNAL MEDICINE
Payer: COMMERCIAL

## 2018-03-30 PROCEDURE — 93798 PHYS/QHP OP CAR RHAB W/ECG: CPT

## 2018-04-04 ENCOUNTER — APPOINTMENT (OUTPATIENT)
Dept: CARDIAC REHAB | Facility: HOSPITAL | Age: 58
End: 2018-04-04
Attending: INTERNAL MEDICINE
Payer: COMMERCIAL

## 2018-04-05 ENCOUNTER — CARDPULM VISIT (OUTPATIENT)
Dept: CARDIAC REHAB | Facility: HOSPITAL | Age: 58
End: 2018-04-05
Attending: INTERNAL MEDICINE
Payer: COMMERCIAL

## 2018-04-05 PROCEDURE — 93798 PHYS/QHP OP CAR RHAB W/ECG: CPT

## 2018-05-12 ENCOUNTER — PRIOR ORIGINAL RECORDS (OUTPATIENT)
Dept: OTHER | Age: 58
End: 2018-05-12

## 2018-07-03 ENCOUNTER — PRIOR ORIGINAL RECORDS (OUTPATIENT)
Dept: OTHER | Age: 58
End: 2018-07-03

## 2018-07-19 LAB
ALBUMIN: 4.8 G/DL
ALKALINE PHOSPHATATE(ALK PHOS): 100 IU/L
BILIRUBIN TOTAL: 0.8 MG/DL
BUN: 15 MG/DL
CALCIUM: 9.8 MG/DL
CHLORIDE: 104 MEQ/L
CHOLESTEROL, TOTAL: 85 MG/DL
CREATININE, SERUM: 1.12 MG/DL
GLOBULIN: 2.5 G/DL
GLUCOSE: 119 MG/DL
HDL CHOLESTEROL: 25 MG/DL
HEMATOCRIT: 44.1 %
HEMOGLOBIN A1C: 6.4 %
HEMOGLOBIN: 14.2 G/DL
LDL CHOLESTEROL: 39 MG/DL
PLATELETS: 274 K/UL
POTASSIUM, SERUM: 4.6 MEQ/L
PROTEIN, TOTAL: 7.3 G/DL
RED BLOOD COUNT: 5.27 X 10-6/U
SGOT (AST): 20 IU/L
SGPT (ALT): 31 IU/L
SODIUM: 138 MEQ/L
TRIGLYCERIDES: 120 MG/DL
WHITE BLOOD COUNT: 6.5 X 10-3/U

## 2018-12-15 ENCOUNTER — PRIOR ORIGINAL RECORDS (OUTPATIENT)
Dept: OTHER | Age: 58
End: 2018-12-15

## 2019-01-01 ENCOUNTER — EXTERNAL RECORD (OUTPATIENT)
Dept: HEALTH INFORMATION MANAGEMENT | Facility: OTHER | Age: 59
End: 2019-01-01

## 2019-01-22 ENCOUNTER — PRIOR ORIGINAL RECORDS (OUTPATIENT)
Dept: OTHER | Age: 59
End: 2019-01-22

## 2019-01-22 ENCOUNTER — MYAURORA ACCOUNT LINK (OUTPATIENT)
Dept: OTHER | Age: 59
End: 2019-01-22

## 2019-02-01 LAB
ALBUMIN: 4.2 G/DL
ALKALINE PHOSPHATATE(ALK PHOS): 68 IU/L
BILIRUBIN TOTAL: 0.7 MG/DL
BUN: 11 MG/DL
CALCIUM: 9 MG/DL
CHLORIDE: 108 MEQ/L
CHOLESTEROL, TOTAL: 83 MG/DL
CREATININE, SERUM: 1.13 MG/DL
GLOBULIN: 2.6 G/DL
GLUCOSE: 125 MG/DL
HDL CHOLESTEROL: 20 MG/DL
HEMATOCRIT: 41.1 %
HEMOGLOBIN A1C: 6.5 %
HEMOGLOBIN: 13.5 G/DL
LDL CHOLESTEROL: 35 MG/DL
PLATELETS: 268 K/UL
POTASSIUM, SERUM: 4.7 MEQ/L
PROTEIN, TOTAL: 6.8 G/DL
RED BLOOD COUNT: 4.8 X 10-6/U
SGOT (AST): 17 IU/L
SGPT (ALT): 19 IU/L
SODIUM: 141 MEQ/L
TRIGLYCERIDES: 214 MG/DL
WHITE BLOOD COUNT: 8.8 X 10-3/U

## 2019-02-28 VITALS
HEART RATE: 73 BPM | DIASTOLIC BLOOD PRESSURE: 70 MMHG | WEIGHT: 145 LBS | SYSTOLIC BLOOD PRESSURE: 90 MMHG | BODY MASS INDEX: 23.3 KG/M2 | HEIGHT: 66 IN

## 2019-02-28 VITALS
HEART RATE: 68 BPM | HEIGHT: 66 IN | WEIGHT: 148 LBS | DIASTOLIC BLOOD PRESSURE: 68 MMHG | BODY MASS INDEX: 23.78 KG/M2 | SYSTOLIC BLOOD PRESSURE: 100 MMHG

## 2019-02-28 VITALS
HEIGHT: 66 IN | WEIGHT: 142 LBS | BODY MASS INDEX: 22.82 KG/M2 | DIASTOLIC BLOOD PRESSURE: 62 MMHG | HEART RATE: 68 BPM | SYSTOLIC BLOOD PRESSURE: 104 MMHG

## 2019-02-28 VITALS
DIASTOLIC BLOOD PRESSURE: 74 MMHG | HEIGHT: 66 IN | HEART RATE: 63 BPM | BODY MASS INDEX: 22.5 KG/M2 | WEIGHT: 140 LBS | SYSTOLIC BLOOD PRESSURE: 112 MMHG

## 2019-04-12 RX ORDER — ROSUVASTATIN CALCIUM 20 MG/1
TABLET, COATED ORAL
COMMUNITY
Start: 2019-01-22 | End: 2019-09-10 | Stop reason: SDUPTHER

## 2019-04-12 RX ORDER — LOSARTAN POTASSIUM 25 MG/1
TABLET ORAL
COMMUNITY
Start: 2018-07-03 | End: 2019-09-10 | Stop reason: SDUPTHER

## 2019-04-12 RX ORDER — METOPROLOL SUCCINATE 50 MG/1
TABLET, EXTENDED RELEASE ORAL
COMMUNITY
Start: 2017-12-06 | End: 2019-09-10 | Stop reason: SDUPTHER

## 2019-04-12 RX ORDER — PANTOPRAZOLE SODIUM 40 MG/1
TABLET, DELAYED RELEASE ORAL
COMMUNITY
Start: 2018-02-20

## 2019-07-08 ENCOUNTER — TELEPHONE (OUTPATIENT)
Dept: CARDIOLOGY | Age: 59
End: 2019-07-08

## 2019-07-13 ENCOUNTER — LAB ENCOUNTER (OUTPATIENT)
Dept: LAB | Facility: HOSPITAL | Age: 59
End: 2019-07-13
Attending: OTOLARYNGOLOGY
Payer: COMMERCIAL

## 2019-07-13 DIAGNOSIS — I10 ESSENTIAL HYPERTENSION: ICD-10-CM

## 2019-07-13 LAB
ALBUMIN SERPL-MCNC: 3.9 G/DL (ref 3.4–5)
ALBUMIN/GLOB SERPL: 1.3 {RATIO} (ref 1–2)
ALP LIVER SERPL-CCNC: 52 U/L (ref 45–117)
ALT SERPL-CCNC: 33 U/L (ref 16–61)
ANION GAP SERPL CALC-SCNC: 4 MMOL/L (ref 0–18)
AST SERPL-CCNC: 15 U/L (ref 15–37)
BILIRUB SERPL-MCNC: 0.8 MG/DL (ref 0.1–2)
BUN BLD-MCNC: 16 MG/DL (ref 7–18)
BUN/CREAT SERPL: 13.8 (ref 10–20)
CALCIUM BLD-MCNC: 9 MG/DL (ref 8.5–10.1)
CHLORIDE SERPL-SCNC: 107 MMOL/L (ref 98–112)
CO2 SERPL-SCNC: 29 MMOL/L (ref 21–32)
CREAT BLD-MCNC: 1.16 MG/DL (ref 0.7–1.3)
GLOBULIN PLAS-MCNC: 3 G/DL (ref 2.8–4.4)
GLUCOSE BLD-MCNC: 125 MG/DL (ref 70–99)
M PROTEIN MFR SERPL ELPH: 6.9 G/DL (ref 6.4–8.2)
OSMOLALITY SERPL CALC.SUM OF ELEC: 293 MOSM/KG (ref 275–295)
POTASSIUM SERPL-SCNC: 4.2 MMOL/L (ref 3.5–5.1)
SODIUM SERPL-SCNC: 140 MMOL/L (ref 136–145)

## 2019-07-13 PROCEDURE — 36415 COLL VENOUS BLD VENIPUNCTURE: CPT

## 2019-07-13 PROCEDURE — 80053 COMPREHEN METABOLIC PANEL: CPT

## 2019-07-13 PROCEDURE — 93005 ELECTROCARDIOGRAM TRACING: CPT

## 2019-07-13 PROCEDURE — 93010 ELECTROCARDIOGRAM REPORT: CPT | Performed by: INTERNAL MEDICINE

## 2019-07-15 LAB
ATRIAL RATE: 55 BPM
P AXIS: 47 DEGREES
P-R INTERVAL: 212 MS
Q-T INTERVAL: 404 MS
QRS DURATION: 84 MS
QTC CALCULATION (BEZET): 386 MS
R AXIS: 33 DEGREES
T AXIS: 45 DEGREES
VENTRICULAR RATE: 55 BPM

## 2019-07-24 ENCOUNTER — LAB REQUISITION (OUTPATIENT)
Dept: LAB | Facility: HOSPITAL | Age: 59
End: 2019-07-24
Payer: COMMERCIAL

## 2019-07-24 DIAGNOSIS — R59.0 LOCALIZED ENLARGED LYMPH NODES: ICD-10-CM

## 2019-07-24 PROCEDURE — 88307 TISSUE EXAM BY PATHOLOGIST: CPT | Performed by: OTOLARYNGOLOGY

## 2019-07-24 PROCEDURE — 88342 IMHCHEM/IMCYTCHM 1ST ANTB: CPT | Performed by: OTOLARYNGOLOGY

## 2019-07-24 PROCEDURE — 88185 FLOWCYTOMETRY/TC ADD-ON: CPT | Performed by: OTOLARYNGOLOGY

## 2019-07-24 PROCEDURE — 88341 IMHCHEM/IMCYTCHM EA ADD ANTB: CPT | Performed by: OTOLARYNGOLOGY

## 2019-07-24 PROCEDURE — 88184 FLOWCYTOMETRY/ TC 1 MARKER: CPT | Performed by: OTOLARYNGOLOGY

## 2019-07-24 PROCEDURE — 88333 PATH CONSLTJ SURG CYTO XM 1: CPT | Performed by: OTOLARYNGOLOGY

## 2019-07-27 LAB
CD10 CELLS NFR SPEC: 3 %
CD11C CELLS NFR SPEC: 4 %
CD14 CELLS NFR SPEC: <1 %
CD19 CELLS NFR SPEC: 20 %
CD19/CD10 CELLS: 3 %
CD20 CELLS NFR SPEC: 21 %
CD22 CELLS NFR SPEC: 20 %
CD23 CELLS NFR SPEC: 15 %
CD3 CELLS NFR SPEC: 77 %
CD3+CD4+ CELLS NFR SPEC: 70 %
CD3+CD4+ CELLS/CD3+CD8+ CLL SPEC: 11.7
CD3+CD8+ CELLS NFR SPEC: 6 %
CD45 CELLS NFR SPEC: 99 %
CD5 CELLS NFR SPEC: 80 %
CD5/CD19 CELLS: 4 %
CD56 CELLS NFR SPEC: 1 %
CD7 CELLS NFR SPEC: 72 %
CELL SURF KAPPA/LAMBDA RATIO: 1.2
CELL SURF LAMBDA LIGHT CHAIN: 9 %
CELL SURFACE KAPPA LIGHT CHAIN: 11 %
FMC7 CELLS NFR SPEC: 20 %

## 2019-08-05 ENCOUNTER — APPOINTMENT (OUTPATIENT)
Dept: CT IMAGING | Age: 59
End: 2019-08-05
Attending: EMERGENCY MEDICINE
Payer: COMMERCIAL

## 2019-08-05 ENCOUNTER — HOSPITAL ENCOUNTER (OUTPATIENT)
Age: 59
Discharge: HOME | End: 2019-08-05
Attending: EMERGENCY MEDICINE
Payer: COMMERCIAL

## 2019-08-05 VITALS
DIASTOLIC BLOOD PRESSURE: 80 MMHG | BODY MASS INDEX: 22.82 KG/M2 | OXYGEN SATURATION: 98 % | RESPIRATION RATE: 18 BRPM | TEMPERATURE: 97 F | WEIGHT: 142 LBS | HEART RATE: 68 BPM | SYSTOLIC BLOOD PRESSURE: 123 MMHG | HEIGHT: 66 IN

## 2019-08-05 DIAGNOSIS — J01.30 ACUTE NON-RECURRENT SPHENOIDAL SINUSITIS: Primary | ICD-10-CM

## 2019-08-05 DIAGNOSIS — R51.9 SINUS HEADACHE: ICD-10-CM

## 2019-08-05 PROCEDURE — 99214 OFFICE O/P EST MOD 30 MIN: CPT

## 2019-08-05 PROCEDURE — 70450 CT HEAD/BRAIN W/O DYE: CPT | Performed by: EMERGENCY MEDICINE

## 2019-08-05 RX ORDER — NAPROXEN 500 MG/1
500 TABLET ORAL 2 TIMES DAILY PRN
Qty: 20 TABLET | Refills: 0 | Status: SHIPPED | OUTPATIENT
Start: 2019-08-05 | End: 2019-08-12

## 2019-08-05 RX ORDER — FEXOFENADINE HCL 180 MG/1
180 TABLET ORAL DAILY
Qty: 20 TABLET | Refills: 0 | Status: SHIPPED | OUTPATIENT
Start: 2019-08-05

## 2019-08-05 RX ORDER — FLUTICASONE PROPIONATE 50 MCG
2 SPRAY, SUSPENSION (ML) NASAL DAILY
Qty: 16 G | Refills: 0 | Status: SHIPPED | OUTPATIENT
Start: 2019-08-05 | End: 2019-09-04

## 2019-08-05 RX ORDER — AMOXICILLIN AND CLAVULANATE POTASSIUM 875; 125 MG/1; MG/1
1 TABLET, FILM COATED ORAL 2 TIMES DAILY
COMMUNITY

## 2019-08-05 NOTE — ED PROVIDER NOTES
Patient Seen in: [facility]    History   Patient presents with:  Headache (neurologic)    Stated Complaint: SEVERE HEADACHE    HPI    21-year-old male presents to the immediate care with a history of a headache for the past week systems reviewed and negative except as noted above.     Physical Exam     ED Triage Vitals [08/05/19 1606]   /80   Pulse 68   Resp 18   Temp 97.1 °F (36.2 °C)   Temp src Oral   SpO2 98 %   O2 Device None (Room air)       Current:/80   Pulse 68 complains of frontal headaches for 1 week. FINDINGS: Ventricles and sulci are within normal limits. There is no midline shift or mass-effect. The basal cisterns are patent. The gray-white matter differentiation is intact.   There is no acute intracrani R-0

## 2019-08-05 NOTE — ED INITIAL ASSESSMENT (HPI)
C/O frontal headache that started 5 days ago. Had lymph node removed from left side in July by Dr. Rob Huerta. Recently started Jardiance 7/25, and stopped 8/1 because he believed it was giving him side effects. Accuchecks 140-150.  Started Augmentin yesterday

## 2019-09-10 ENCOUNTER — OFFICE VISIT (OUTPATIENT)
Dept: CARDIOLOGY | Age: 59
End: 2019-09-10

## 2019-09-10 VITALS
WEIGHT: 141 LBS | SYSTOLIC BLOOD PRESSURE: 98 MMHG | HEIGHT: 66 IN | BODY MASS INDEX: 22.66 KG/M2 | HEART RATE: 67 BPM | DIASTOLIC BLOOD PRESSURE: 60 MMHG

## 2019-09-10 DIAGNOSIS — I25.10 CORONARY ARTERY DISEASE INVOLVING NATIVE CORONARY ARTERY OF NATIVE HEART WITHOUT ANGINA PECTORIS: Primary | ICD-10-CM

## 2019-09-10 DIAGNOSIS — I25.2 STATUS POST NON-ST ELEVATION MYOCARDIAL INFARCTION (NSTEMI): ICD-10-CM

## 2019-09-10 DIAGNOSIS — Z95.5 HISTORY OF HEART ARTERY STENT: ICD-10-CM

## 2019-09-10 LAB
25(OH)D3 SERPL-MCNC: 16 NG/ML (ref 30–100)
ALBUMIN/CREAT UR: 65 MCG/MG CREAT
ALT SERPL-CCNC: 22 U/L (ref 9–46)
BUN SERPL-MCNC: 16 MG/DL (ref 7–25)
BUN/CREAT SERPL: ABNORMAL (CALC) (ref 6–22)
CALCIUM SERPL-MCNC: 9.6 MG/DL (ref 8.6–10.3)
CHLORIDE SERPL-SCNC: 104 MMOL/L (ref 98–110)
CHOLEST SERPL-MCNC: 86 MG/DL
CHOLEST/HDLC SERPL: 3.4 (CALC)
CO2 SERPL-SCNC: 28 MMOL/L (ref 20–32)
CREAT SERPL-MCNC: 1.07 MG/DL (ref 0.7–1.25)
CREAT UR-MCNC: 144 MG/DL (ref 20–320)
GFRSERPLBLD MDRD-ARVRAT: 75 ML/MIN/1.73M2
GLUCOSE SERPL-MCNC: 132 MG/DL (ref 65–99)
HDLC SERPL-MCNC: 25 MG/DL
LDLC SERPL CALC-MCNC: 39 MG/DL (CALC)
MICROALBUMIN UR-MCNC: 9.4 MG/DL
NONHDLC SERPL-MCNC: 61 MG/DL (CALC)
POTASSIUM SERPL-SCNC: 4.6 MMOL/L (ref 3.5–5.3)
SODIUM SERPL-SCNC: 139 MMOL/L (ref 135–146)
TRIGL SERPL-MCNC: 140 MG/DL
TSH SERPL-ACNC: 2.79 MIU/L (ref 0.4–4.5)

## 2019-09-10 PROCEDURE — 99214 OFFICE O/P EST MOD 30 MIN: CPT | Performed by: INTERNAL MEDICINE

## 2019-09-10 RX ORDER — ROSUVASTATIN CALCIUM 20 MG/1
20 TABLET, COATED ORAL DAILY
Qty: 90 TABLET | Refills: 3 | Status: SHIPPED | OUTPATIENT
Start: 2019-09-10 | End: 2020-06-22 | Stop reason: SDUPTHER

## 2019-09-10 RX ORDER — LOSARTAN POTASSIUM 25 MG/1
25 TABLET ORAL DAILY
Qty: 90 TABLET | Refills: 3 | Status: SHIPPED | OUTPATIENT
Start: 2019-09-10 | End: 2020-06-22 | Stop reason: SDUPTHER

## 2019-09-10 RX ORDER — METOPROLOL SUCCINATE 50 MG/1
50 TABLET, EXTENDED RELEASE ORAL DAILY
Qty: 90 TABLET | Refills: 3 | Status: SHIPPED | OUTPATIENT
Start: 2019-09-10 | End: 2020-06-22 | Stop reason: SDUPTHER

## 2019-09-10 SDOH — HEALTH STABILITY: MENTAL HEALTH: HOW OFTEN DO YOU HAVE A DRINK CONTAINING ALCOHOL?: NEVER

## 2020-01-01 ENCOUNTER — EXTERNAL RECORD (OUTPATIENT)
Dept: HEALTH INFORMATION MANAGEMENT | Facility: OTHER | Age: 60
End: 2020-01-01

## 2020-02-18 LAB
ALBUMIN SERPL-MCNC: 4.4 G/DL (ref 3.6–5.1)
ALBUMIN/GLOB SERPL: 1.6 (CALC) (ref 1–2.5)
ALP SERPL-CCNC: 55 U/L (ref 35–144)
ALT SERPL-CCNC: 20 U/L (ref 9–46)
AST SERPL-CCNC: 17 U/L (ref 10–35)
BASOPHILS # BLD AUTO: 62 CELLS/UL (ref 0–200)
BASOPHILS NFR BLD AUTO: 1 %
BILIRUB SERPL-MCNC: 0.8 MG/DL (ref 0.2–1.2)
BUN SERPL-MCNC: 13 MG/DL (ref 7–25)
BUN/CREAT SERPL: ABNORMAL (CALC) (ref 6–22)
CALCIUM SERPL-MCNC: 9.6 MG/DL (ref 8.6–10.3)
CHLORIDE SERPL-SCNC: 105 MMOL/L (ref 98–110)
CHOLEST SERPL-MCNC: 77 MG/DL
CHOLEST/HDLC SERPL: 3.5 (CALC)
CO2 SERPL-SCNC: 27 MMOL/L (ref 20–32)
CREAT SERPL-MCNC: 1.02 MG/DL (ref 0.7–1.25)
EOSINOPHIL # BLD AUTO: 825 CELLS/UL (ref 15–500)
EOSINOPHIL NFR BLD AUTO: 13.3 %
ERYTHROCYTE [DISTWIDTH] IN BLOOD BY AUTOMATED COUNT: 13.6 % (ref 11–15)
GFRSERPLBLD MDRD-ARVRAT: 80 ML/MIN/1.73M2
GLOBULIN SER CALC-MCNC: 2.7 G/DL (CALC) (ref 1.9–3.7)
GLUCOSE SERPL-MCNC: 139 MG/DL (ref 65–99)
HBA1C MFR BLD: 7.3 % OF TOTAL HGB
HCT VFR BLD AUTO: 42.7 % (ref 38.5–50)
HDLC SERPL-MCNC: 22 MG/DL
HGB BLD-MCNC: 14.1 G/DL (ref 13.2–17.1)
LDLC SERPL CALC-MCNC: 31 MG/DL (CALC)
LYMPHOCYTES # BLD AUTO: 1891 CELLS/UL (ref 850–3900)
LYMPHOCYTES NFR BLD AUTO: 30.5 %
MCH RBC QN AUTO: 27.4 PG (ref 27–33)
MCHC RBC AUTO-ENTMCNC: 33 G/DL (ref 32–36)
MCV RBC AUTO: 82.9 FL (ref 80–100)
MONOCYTES # BLD AUTO: 378 CELLS/UL (ref 200–950)
MONOCYTES NFR BLD AUTO: 6.1 %
NEUTROPHILS # BLD AUTO: 3044 CELLS/UL (ref 1500–7800)
NEUTROPHILS NFR BLD AUTO: 49.1 %
NONHDLC SERPL-MCNC: 55 MG/DL (CALC)
PLATELET # BLD AUTO: 227 THOUSAND/UL (ref 140–400)
PMV BLD REES-ECKER: 11.1 FL (ref 7.5–12.5)
POTASSIUM SERPL-SCNC: 4.3 MMOL/L (ref 3.5–5.3)
PROT SERPL-MCNC: 7.1 G/DL (ref 6.1–8.1)
RBC # BLD AUTO: 5.15 MILLION/UL (ref 4.2–5.8)
SODIUM SERPL-SCNC: 139 MMOL/L (ref 135–146)
TRIGL SERPL-MCNC: 159 MG/DL
WBC # BLD AUTO: 6.2 THOUSAND/UL (ref 3.8–10.8)

## 2020-03-20 ENCOUNTER — TELEPHONE (OUTPATIENT)
Dept: CARDIOLOGY | Age: 60
End: 2020-03-20

## 2020-03-24 ENCOUNTER — OFFICE VISIT (OUTPATIENT)
Dept: CARDIOLOGY | Age: 60
End: 2020-03-24

## 2020-03-24 VITALS — WEIGHT: 140 LBS | BODY MASS INDEX: 22.6 KG/M2

## 2020-03-24 DIAGNOSIS — Z95.5 HISTORY OF HEART ARTERY STENT: ICD-10-CM

## 2020-03-24 DIAGNOSIS — I25.2 STATUS POST NON-ST ELEVATION MYOCARDIAL INFARCTION (NSTEMI): ICD-10-CM

## 2020-03-24 DIAGNOSIS — I25.10 CORONARY ARTERY DISEASE INVOLVING NATIVE CORONARY ARTERY OF NATIVE HEART WITHOUT ANGINA PECTORIS: Primary | ICD-10-CM

## 2020-03-24 PROCEDURE — 99213 OFFICE O/P EST LOW 20 MIN: CPT | Performed by: INTERNAL MEDICINE

## 2020-03-24 SDOH — SOCIAL STABILITY: SOCIAL NETWORK: ARE YOU MARRIED, WIDOWED, DIVORCED, SEPARATED, NEVER MARRIED, OR LIVING WITH A PARTNER?: MARRIED

## 2020-03-24 SDOH — HEALTH STABILITY: MENTAL HEALTH: HOW OFTEN DO YOU HAVE A DRINK CONTAINING ALCOHOL?: NEVER

## 2020-06-22 RX ORDER — ROSUVASTATIN CALCIUM 20 MG/1
20 TABLET, COATED ORAL DAILY
Qty: 90 TABLET | Refills: 3 | Status: SHIPPED | OUTPATIENT
Start: 2020-06-22

## 2020-06-22 RX ORDER — LOSARTAN POTASSIUM 25 MG/1
25 TABLET ORAL DAILY
Qty: 90 TABLET | Refills: 3 | Status: SHIPPED | OUTPATIENT
Start: 2020-06-22

## 2020-06-22 RX ORDER — METOPROLOL SUCCINATE 50 MG/1
50 TABLET, EXTENDED RELEASE ORAL DAILY
Qty: 90 TABLET | Refills: 3 | Status: SHIPPED | OUTPATIENT
Start: 2020-06-22

## 2020-07-09 ENCOUNTER — TELEPHONE (OUTPATIENT)
Dept: CARDIOLOGY | Age: 60
End: 2020-07-09

## 2020-08-14 ENCOUNTER — E-ADVICE (OUTPATIENT)
Dept: CARDIOLOGY | Age: 60
End: 2020-08-14

## 2020-10-11 ENCOUNTER — TELEPHONE (OUTPATIENT)
Dept: ENDOCRINOLOGY CLINIC | Facility: CLINIC | Age: 60
End: 2020-10-11

## 2020-10-11 NOTE — TELEPHONE ENCOUNTER
Patient was in the ER  New diagnosis of DM  Please book with Dr. Concha Jonas / Hua Blake  It will be a consult  Should ideally book in the next week  Thanks

## 2020-10-12 NOTE — TELEPHONE ENCOUNTER
Please ignore this message  I think this was routed to me in error  Another patient with similar name was meant to be called  Will send a separate encounter for that  Thanks

## 2021-04-15 ENCOUNTER — TELEPHONE (OUTPATIENT)
Dept: CARDIOLOGY | Age: 61
End: 2021-04-15

## 2021-05-04 ENCOUNTER — OFFICE VISIT (OUTPATIENT)
Dept: CARDIOLOGY | Age: 61
End: 2021-05-04

## 2021-05-04 VITALS
DIASTOLIC BLOOD PRESSURE: 60 MMHG | HEIGHT: 66 IN | BODY MASS INDEX: 22.5 KG/M2 | WEIGHT: 140 LBS | HEART RATE: 62 BPM | SYSTOLIC BLOOD PRESSURE: 102 MMHG

## 2021-05-04 DIAGNOSIS — I25.2 STATUS POST NON-ST ELEVATION MYOCARDIAL INFARCTION (NSTEMI): ICD-10-CM

## 2021-05-04 DIAGNOSIS — I25.10 CORONARY ARTERY DISEASE INVOLVING NATIVE CORONARY ARTERY OF NATIVE HEART WITHOUT ANGINA PECTORIS: Primary | ICD-10-CM

## 2021-05-04 DIAGNOSIS — Z95.5 HISTORY OF HEART ARTERY STENT: ICD-10-CM

## 2021-05-04 PROCEDURE — 99214 OFFICE O/P EST MOD 30 MIN: CPT | Performed by: INTERNAL MEDICINE

## 2021-05-04 RX ORDER — FINASTERIDE 5 MG/1
5 TABLET, FILM COATED ORAL DAILY
COMMUNITY
Start: 2021-04-05

## 2021-05-04 ASSESSMENT — PATIENT HEALTH QUESTIONNAIRE - PHQ9
SUM OF ALL RESPONSES TO PHQ9 QUESTIONS 1 AND 2: 0
2. FEELING DOWN, DEPRESSED OR HOPELESS: NOT AT ALL
SUM OF ALL RESPONSES TO PHQ9 QUESTIONS 1 AND 2: 0
CLINICAL INTERPRETATION OF PHQ2 SCORE: NO FURTHER SCREENING NEEDED
1. LITTLE INTEREST OR PLEASURE IN DOING THINGS: NOT AT ALL
CLINICAL INTERPRETATION OF PHQ9 SCORE: NO FURTHER SCREENING NEEDED

## 2021-07-12 ENCOUNTER — TELEPHONE (OUTPATIENT)
Dept: CARDIOLOGY | Age: 61
End: 2021-07-12

## 2022-05-10 ENCOUNTER — APPOINTMENT (OUTPATIENT)
Dept: CARDIOLOGY | Age: 62
End: 2022-05-10

## 2024-01-30 ENCOUNTER — OFFICE VISIT (OUTPATIENT)
Dept: FAMILY MEDICINE CLINIC | Facility: CLINIC | Age: 64
End: 2024-01-30
Payer: COMMERCIAL

## 2024-01-30 ENCOUNTER — TELEPHONE (OUTPATIENT)
Dept: FAMILY MEDICINE CLINIC | Facility: CLINIC | Age: 64
End: 2024-01-30

## 2024-01-30 VITALS
HEART RATE: 83 BPM | OXYGEN SATURATION: 100 % | RESPIRATION RATE: 16 BRPM | WEIGHT: 133 LBS | TEMPERATURE: 97 F | BODY MASS INDEX: 22.71 KG/M2 | SYSTOLIC BLOOD PRESSURE: 114 MMHG | HEIGHT: 64 IN | DIASTOLIC BLOOD PRESSURE: 62 MMHG

## 2024-01-30 DIAGNOSIS — B02.9 HERPES ZOSTER WITHOUT COMPLICATION: Primary | ICD-10-CM

## 2024-01-30 PROBLEM — M54.6 CHRONIC LEFT-SIDED THORACIC BACK PAIN: Status: ACTIVE | Noted: 2022-11-03

## 2024-01-30 PROBLEM — E55.9 VITAMIN D DEFICIENCY: Status: ACTIVE | Noted: 2019-09-12

## 2024-01-30 PROBLEM — I25.10 CAD (CORONARY ARTERY DISEASE): Status: ACTIVE | Noted: 2019-09-10

## 2024-01-30 PROBLEM — E03.8 SUBCLINICAL HYPOTHYROIDISM: Status: ACTIVE | Noted: 2022-02-26

## 2024-01-30 PROBLEM — G89.29 CHRONIC LEFT-SIDED THORACIC BACK PAIN: Status: ACTIVE | Noted: 2022-11-03

## 2024-01-30 PROBLEM — R20.2 PARESTHESIA OF LEFT ARM: Status: ACTIVE | Noted: 2022-11-03

## 2024-01-30 PROBLEM — R22.1 NECK NODULE: Status: ACTIVE | Noted: 2017-12-02

## 2024-01-30 PROBLEM — I25.2 STATUS POST NON-ST ELEVATION MYOCARDIAL INFARCTION (NSTEMI): Status: ACTIVE | Noted: 2017-12-18

## 2024-01-30 PROBLEM — E78.2 MIXED HYPERLIPIDEMIA: Status: ACTIVE | Noted: 2017-12-02

## 2024-01-30 PROCEDURE — 99203 OFFICE O/P NEW LOW 30 MIN: CPT | Performed by: FAMILY MEDICINE

## 2024-01-30 PROCEDURE — 3008F BODY MASS INDEX DOCD: CPT | Performed by: FAMILY MEDICINE

## 2024-01-30 PROCEDURE — 3074F SYST BP LT 130 MM HG: CPT | Performed by: FAMILY MEDICINE

## 2024-01-30 PROCEDURE — 3078F DIAST BP <80 MM HG: CPT | Performed by: FAMILY MEDICINE

## 2024-01-30 RX ORDER — FINASTERIDE 5 MG/1
1 TABLET, FILM COATED ORAL DAILY
COMMUNITY
Start: 2021-04-05

## 2024-01-30 RX ORDER — ROSUVASTATIN CALCIUM 10 MG/1
10 TABLET, COATED ORAL NIGHTLY
COMMUNITY

## 2024-01-30 RX ORDER — PREGABALIN 25 MG/1
25 CAPSULE ORAL 2 TIMES DAILY
Qty: 30 CAPSULE | Refills: 0 | Status: SHIPPED | OUTPATIENT
Start: 2024-01-30

## 2024-01-30 RX ORDER — SEMAGLUTIDE 0.68 MG/ML
0.5 INJECTION, SOLUTION SUBCUTANEOUS WEEKLY
COMMUNITY

## 2024-01-30 RX ORDER — PREDNISONE 20 MG/1
20 TABLET ORAL DAILY
Qty: 5 TABLET | Refills: 0 | Status: SHIPPED | OUTPATIENT
Start: 2024-01-30 | End: 2024-02-04

## 2024-01-30 RX ORDER — ICOSAPENT ETHYL 1000 MG/1
CAPSULE ORAL
COMMUNITY
Start: 2023-05-16

## 2024-01-30 RX ORDER — VALACYCLOVIR HYDROCHLORIDE 1 G/1
1000 TABLET, FILM COATED ORAL EVERY 12 HOURS SCHEDULED
Qty: 20 TABLET | Refills: 0 | Status: SHIPPED | OUTPATIENT
Start: 2024-01-30 | End: 2024-02-09

## 2024-01-30 NOTE — PROGRESS NOTES
/62   Pulse 83   Temp 97.3 °F (36.3 °C) (Temporal)   Resp 16   Ht 5' 4\" (1.626 m)   Wt 133 lb (60.3 kg)   SpO2 100%   BMI 22.83 kg/m²               Chief Complaint   Patient presents with    Establish Care    Hair/Scalp Problem    Derm Problem     On face         HPI;    Hammad Diallo is a 63 year old male.  Seen here for the first time  History of type 2 diabetes mellitus BPH hyperlipidemia and hypertension as well as coronary artery disease, comes here today with 5-day history of right upper extremity pain starting from the base of the neck going down to the shoulder into the arm, patient developed a painful rash the following day located from the base of the scalp onto the right side as well as going to the jaw and behind the ear  The lesions are blistery and very painful  There is mild itching  Patient denies any fever but feels that he also has a sore throat            Wt Readings from Last 3 Encounters:   01/30/24 133 lb (60.3 kg)   07/29/20 140 lb (63.5 kg)   08/05/19 142 lb (64.4 kg)     BP Readings from Last 3 Encounters:   01/30/24 114/62   08/05/19 123/80   12/06/17 106/66         ALLERGIES:  Allergies   Allergen Reactions    Dust Mite Extract OTHER (SEE COMMENTS)    Food ITCHING    Walnuts ITCHING     Current Outpatient Medications   Medication Sig Dispense Refill    OZEMPIC, 0.25 OR 0.5 MG/DOSE, 2 MG/3ML Subcutaneous Solution Pen-injector Inject 0.5 mg into the skin once a week.      rosuvastatin 10 MG Oral Tab Take 1 tablet (10 mg total) by mouth nightly.      finasteride 5 MG Oral Tab Take 1 tablet (5 mg total) by mouth daily.      VASCEPA 1 g Oral Cap Vascepa 1 gram capsule, [RxNorm: 4052411]      valACYclovir 1 G Oral Tab Take 1 tablet (1,000 mg total) by mouth every 12 (twelve) hours for 10 days. 20 tablet 0    predniSONE 20 MG Oral Tab Take 1 tablet (20 mg total) by mouth daily for 5 days. 5 tablet 0    pregabalin (LYRICA) 25 MG Oral Cap Take 1 capsule (25 mg total) by mouth 2 (two)  times daily. 30 capsule 0    Lidocaine 0.5 % External Gel Apply 1 Application topically in the morning and 1 Application before bedtime. 170 g 0    Pantoprazole Sodium 40 MG Oral Tab EC Take 1 tablet (40 mg total) by mouth daily.      Losartan Potassium 25 MG Oral Tab Take 1 tablet (25 mg total) by mouth nightly. 30 tablet 6    Metoprolol Succinate ER 50 MG Oral Tablet 24 Hr Take 1 tablet (50 mg total) by mouth Daily Beta Blocker. 30 tablet 6    MetFORMIN HCl 1000 MG Oral Tab Take 1 tablet (1,000 mg total) by mouth daily with breakfast.      Aspirin 81 MG Oral Tab 1 TABLET DAILY        Past Medical History:   Diagnosis Date    Diabetes (HCC)     Unspecified essential hypertension     Visual impairment     retinal detachment left eye       Social History:  Social History     Socioeconomic History    Marital status:    Tobacco Use    Smoking status: Former     Packs/day: 0.50     Years: 5.00     Additional pack years: 0.00     Total pack years: 2.50     Types: Cigarettes     Quit date:      Years since quittin.1    Smokeless tobacco: Never   Substance and Sexual Activity    Alcohol use: No    Drug use: No        REVIEW OF SYSTEMS:   GENERAL HEALTH: feels well otherwise  SKIN: denies any unusual skin lesions or rashes  RESPIRATORY: denies shortness of breath with exertion  CARDIOVASCULAR: denies chest pain on exertion  GI: denies abdominal pain and denies heartburn  NEURO: denies headaches  EXAM:   /62   Pulse 83   Temp 97.3 °F (36.3 °C) (Temporal)   Resp 16   Ht 5' 4\" (1.626 m)   Wt 133 lb (60.3 kg)   SpO2 100%   BMI 22.83 kg/m²   GENERAL: well developed, well nourished,in no apparent distress  SKIN: Vesicular rash with lesions on the right side of the scalp starting from the base of the neck to the occipital area going into the temporal as well as behind the ear proximal skin of the jaw, very tender to touch.  HEENT: atraumatic, normocephalic,ears and throat are clear  Vision is normal  and unchanged, extraocular movements are nontender  NECK: supple,no adenopathy,no bruits  LUNGS: clear to auscultation  CARDIO: RRR without murmur  GI: good BS's,no masses, HSM or tenderness  EXTREMITIES: no cyanosis, clubbing or edema    ASSESSMENT AND PLAN:   Diagnoses and all orders for this visit:    Herpes zoster without complication  I had a long discussion with the patient regarding shingles, prevention and treatment  We also discussed the spread  Starting the patient on Valtrex  Lyrica as well as prednisone  Lidocaine gel for local application twice a day  Patient is advised to go to the ER if he feels that the rash is spreading to the eye  Prevention discussed  Patient to stay away from newborns and pregnant ladies  Follow-up in 1 week-     valACYclovir 1 G Oral Tab; Take 1 tablet (1,000 mg total) by mouth every 12 (twelve) hours for 10 days.  -     predniSONE 20 MG Oral Tab; Take 1 tablet (20 mg total) by mouth daily for 5 days.  -     pregabalin (LYRICA) 25 MG Oral Cap; Take 1 capsule (25 mg total) by mouth 2 (two) times daily.  -     Lidocaine 0.5 % External Gel; Apply 1 Application topically in the morning and 1 Application before bedtime.    Time spent at appointment today is 30 minutes including preparing to see patient, reviewing test results, performing medically appropriate examination and evaluation and coordinating care, counseling and educating patient/family, ordering medications and testing, and documenting clinical information in EMR.     The patient indicates understanding of these issues and agrees to the plan.  Imaging & Consults:  None  Meds & Refills for this Visit:  Requested Prescriptions     Signed Prescriptions Disp Refills    valACYclovir 1 G Oral Tab 20 tablet 0     Sig: Take 1 tablet (1,000 mg total) by mouth every 12 (twelve) hours for 10 days.    predniSONE 20 MG Oral Tab 5 tablet 0     Sig: Take 1 tablet (20 mg total) by mouth daily for 5 days.    pregabalin (LYRICA) 25 MG Oral  Cap 30 capsule 0     Sig: Take 1 capsule (25 mg total) by mouth 2 (two) times daily.    Lidocaine 0.5 % External Gel 170 g 0     Sig: Apply 1 Application topically in the morning and 1 Application before bedtime.     No orders of the defined types were placed in this encounter.            Derek Camacho MD   [facility]  [address] [place] [id number]    Electronically signed    This dictation was performed with a verbal recognition program (DRAGON) and it was checked for errors. It is possible that there are still dictated errors within this office note. If so, please bring any errors to my attention for an addendum. All efforts were made to ensure that this office note is accurate

## 2024-02-07 ENCOUNTER — OFFICE VISIT (OUTPATIENT)
Dept: FAMILY MEDICINE CLINIC | Facility: CLINIC | Age: 64
End: 2024-02-07
Payer: COMMERCIAL

## 2024-02-07 VITALS
HEART RATE: 85 BPM | TEMPERATURE: 98 F | DIASTOLIC BLOOD PRESSURE: 58 MMHG | BODY MASS INDEX: 23 KG/M2 | SYSTOLIC BLOOD PRESSURE: 102 MMHG | RESPIRATION RATE: 18 BRPM | HEIGHT: 64 IN | OXYGEN SATURATION: 98 %

## 2024-02-07 DIAGNOSIS — B02.29 POSTHERPETIC NEURALGIA: Primary | ICD-10-CM

## 2024-02-07 DIAGNOSIS — B02.9 HERPES ZOSTER WITHOUT COMPLICATION: ICD-10-CM

## 2024-02-07 PROCEDURE — 3078F DIAST BP <80 MM HG: CPT | Performed by: FAMILY MEDICINE

## 2024-02-07 PROCEDURE — 99213 OFFICE O/P EST LOW 20 MIN: CPT | Performed by: FAMILY MEDICINE

## 2024-02-07 PROCEDURE — 3074F SYST BP LT 130 MM HG: CPT | Performed by: FAMILY MEDICINE

## 2024-02-07 RX ORDER — TRAMADOL HYDROCHLORIDE 50 MG/1
50 TABLET ORAL NIGHTLY
Qty: 30 TABLET | Refills: 1 | Status: SHIPPED | OUTPATIENT
Start: 2024-02-07

## 2024-02-07 RX ORDER — PREGABALIN 25 MG/1
25 CAPSULE ORAL 2 TIMES DAILY
Qty: 60 CAPSULE | Refills: 0 | Status: SHIPPED | OUTPATIENT
Start: 2024-02-07

## 2024-02-07 NOTE — PROGRESS NOTES
/58   Pulse 85   Temp 97.7 °F (36.5 °C) (Temporal)   Resp 18   Ht 5' 4\" (1.626 m)   SpO2 98%   BMI 22.83 kg/m²               Chief Complaint   Patient presents with    Shingles        HPI;    Hammad Diallo is a 63 year old male.  Who was diagnosed with shingles last week is here for follow-up  Most of the lesions on his scalp has healed but he is complaining of severe pain located on the right temporal parietal and occipital scalp going down to the right side of the neck into the right upper extremity  Patient has been taking Lyrica twice a day  He has taken ibuprofen 200 mg that helps somewhat but given the history of gastroesophageal reflux he is reluctant to take more        Wt Readings from Last 3 Encounters:   01/30/24 133 lb (60.3 kg)   07/29/20 140 lb (63.5 kg)   08/05/19 142 lb (64.4 kg)     BP Readings from Last 3 Encounters:   02/07/24 102/58   01/30/24 114/62   08/05/19 123/80         ALLERGIES:  Allergies   Allergen Reactions    Dust Mite Extract OTHER (SEE COMMENTS)    Food ITCHING    Walnuts ITCHING     Current Outpatient Medications   Medication Sig Dispense Refill    pregabalin (LYRICA) 25 MG Oral Cap Take 1 capsule (25 mg total) by mouth 2 (two) times daily. 60 capsule 0    traMADol 50 MG Oral Tab Take 1 tablet (50 mg total) by mouth at bedtime. 30 tablet 1    OZEMPIC, 0.25 OR 0.5 MG/DOSE, 2 MG/3ML Subcutaneous Solution Pen-injector Inject 0.5 mg into the skin once a week.      rosuvastatin 10 MG Oral Tab Take 1 tablet (10 mg total) by mouth nightly.      finasteride 5 MG Oral Tab Take 1 tablet (5 mg total) by mouth daily.      VASCEPA 1 g Oral Cap Vascepa 1 gram capsule, [RxNorm: 7533031]      valACYclovir 1 G Oral Tab Take 1 tablet (1,000 mg total) by mouth every 12 (twelve) hours for 10 days. 20 tablet 0    Lidocaine 0.5 % External Gel Apply 1 Application topically in the morning and 1 Application before bedtime. 170 g 0    Pantoprazole Sodium 40 MG Oral Tab EC Take 1 tablet (40 mg  total) by mouth daily.      Losartan Potassium 25 MG Oral Tab Take 1 tablet (25 mg total) by mouth nightly. 30 tablet 6    Metoprolol Succinate ER 50 MG Oral Tablet 24 Hr Take 1 tablet (50 mg total) by mouth Daily Beta Blocker. 30 tablet 6    MetFORMIN HCl 1000 MG Oral Tab Take 1 tablet (1,000 mg total) by mouth daily with breakfast.      Aspirin 81 MG Oral Tab 1 TABLET DAILY        Past Medical History:   Diagnosis Date    Diabetes (HCC)     Unspecified essential hypertension     Visual impairment     retinal detachment left eye       Social History:  Social History     Socioeconomic History    Marital status:    Tobacco Use    Smoking status: Former     Packs/day: 0.50     Years: 5.00     Additional pack years: 0.00     Total pack years: 2.50     Types: Cigarettes     Quit date:      Years since quittin.1    Smokeless tobacco: Never   Substance and Sexual Activity    Alcohol use: No    Drug use: No        REVIEW OF SYSTEMS:   GENERAL HEALTH: feels well otherwise  SKIN: denies any unusual skin lesions or rashes  RESPIRATORY: denies shortness of breath with exertion  CARDIOVASCULAR: denies chest pain on exertion  GI: denies abdominal pain and denies heartburn  NEURO: denies headaches  EXAM:   /58   Pulse 85   Temp 97.7 °F (36.5 °C) (Temporal)   Resp 18   Ht 5' 4\" (1.626 m)   SpO2 98%   BMI 22.83 kg/m²   GENERAL: well developed, well nourished,in no apparent distress  SKIN: Healed scab lesions on the scalp, located on the right temporal parietal, occipital area  NECK: supple,no adenopathy,no bruits  LUNGS: clear to auscultation  CARDIO: RRR without murmur  Musculoskeletal-tenderness over the right cervical paraspinal muscles, right trapezius    ASSESSMENT AND PLAN:   Diagnoses and all orders for this visit:    Postherpetic neuralgia  Herpes zoster without complication  Improving, for the pain patient is advised to take Tylenol/Motrin 400 mg once a day after food along with the Protonix in  the morning  He can take tramadol 50 mg at bedtime  Continue Lyrica twice a day  We discussed the prolonged course of postherpetic neuralgia with the patient  Side effects of the medication discussed  -     pregabalin (LYRICA) 25 MG Oral Cap; Take 1 capsule (25 mg total) by mouth 2 (two) times daily.  -     traMADol 50 MG Oral Tab; Take 1 tablet (50 mg total) by mouth at bedtime.    Follow-up in a month    The patient indicates understanding of these issues and agrees to the plan.  Imaging & Consults:  None  Meds & Refills for this Visit:  Requested Prescriptions     Signed Prescriptions Disp Refills    pregabalin (LYRICA) 25 MG Oral Cap 60 capsule 0     Sig: Take 1 capsule (25 mg total) by mouth 2 (two) times daily.    traMADol 50 MG Oral Tab 30 tablet 1     Sig: Take 1 tablet (50 mg total) by mouth at bedtime.     No orders of the defined types were placed in this encounter.            Derek Camacho MD   [facility]  [address], [address] [address]    Electronically signed    This dictation was performed with a verbal recognition program (DRAGON) and it was checked for errors. It is possible that there are still dictated errors within this office note. If so, please bring any errors to my attention for an addendum. All efforts were made to ensure that this office note is accurate

## 2024-03-04 ENCOUNTER — PATIENT MESSAGE (OUTPATIENT)
Dept: FAMILY MEDICINE CLINIC | Facility: CLINIC | Age: 64
End: 2024-03-04

## 2024-03-06 NOTE — TELEPHONE ENCOUNTER
Suyapa Proctor, RN 3/5/2024 9:20 AM CST      ----- Message -----  From: Hammad Diallo  Sent: 3/4/2024 9:51 PM CST  To: [facility] Clinical Staff  Subject: My Health Update       Ophthalmologist at [facility]  Yearly once with Dr. Romero Merino    Endocrinologist at [facility]  Twice a year with Dr. CONCEPCIÓN Jerome for diabetes.    Urologist at [facility]  Once a year with Dr. Juanito De Guzman for enlarged prostate.    Cardiologist at [facility]  Twice a year with Dr. Catarino Sparks put a single stent in the main left artery.    Diagnosed with Apnea in 2012, using a CPAP sleeping machine.

## 2024-03-09 ENCOUNTER — OFFICE VISIT (OUTPATIENT)
Dept: FAMILY MEDICINE CLINIC | Facility: CLINIC | Age: 64
End: 2024-03-09
Payer: COMMERCIAL

## 2024-03-09 VITALS
OXYGEN SATURATION: 98 % | HEART RATE: 63 BPM | HEIGHT: 64 IN | BODY MASS INDEX: 22.88 KG/M2 | WEIGHT: 134 LBS | DIASTOLIC BLOOD PRESSURE: 54 MMHG | TEMPERATURE: 97 F | SYSTOLIC BLOOD PRESSURE: 108 MMHG | RESPIRATION RATE: 16 BRPM

## 2024-03-09 DIAGNOSIS — I25.10 CORONARY ARTERY DISEASE INVOLVING NATIVE CORONARY ARTERY OF NATIVE HEART WITHOUT ANGINA PECTORIS: ICD-10-CM

## 2024-03-09 DIAGNOSIS — Z00.00 ENCOUNTER FOR ANNUAL PHYSICAL EXAM: Primary | ICD-10-CM

## 2024-03-09 DIAGNOSIS — E11.65 TYPE 2 DIABETES MELLITUS WITH HYPERGLYCEMIA, WITHOUT LONG-TERM CURRENT USE OF INSULIN (HCC): ICD-10-CM

## 2024-03-09 DIAGNOSIS — B36.9 FUNGAL DERMATITIS: ICD-10-CM

## 2024-03-09 DIAGNOSIS — B02.29 POSTHERPETIC NEURALGIA: ICD-10-CM

## 2024-03-09 PROCEDURE — 3074F SYST BP LT 130 MM HG: CPT | Performed by: FAMILY MEDICINE

## 2024-03-09 PROCEDURE — 99396 PREV VISIT EST AGE 40-64: CPT | Performed by: FAMILY MEDICINE

## 2024-03-09 PROCEDURE — 90715 TDAP VACCINE 7 YRS/> IM: CPT | Performed by: FAMILY MEDICINE

## 2024-03-09 PROCEDURE — 90677 PCV20 VACCINE IM: CPT | Performed by: FAMILY MEDICINE

## 2024-03-09 PROCEDURE — 99214 OFFICE O/P EST MOD 30 MIN: CPT | Performed by: FAMILY MEDICINE

## 2024-03-09 PROCEDURE — 90471 IMMUNIZATION ADMIN: CPT | Performed by: FAMILY MEDICINE

## 2024-03-09 PROCEDURE — 3078F DIAST BP <80 MM HG: CPT | Performed by: FAMILY MEDICINE

## 2024-03-09 PROCEDURE — 90472 IMMUNIZATION ADMIN EACH ADD: CPT | Performed by: FAMILY MEDICINE

## 2024-03-09 PROCEDURE — 3008F BODY MASS INDEX DOCD: CPT | Performed by: FAMILY MEDICINE

## 2024-03-09 NOTE — PROGRESS NOTES
/54   Pulse 63   Temp 97.1 °F (36.2 °C) (Temporal)   Resp 16   Ht 5' 4\" (1.626 m)   Wt 134 lb (60.8 kg)   SpO2 98%   BMI 23.00 kg/m²  Body mass index is 23 kg/m².     Chief Complaint   Patient presents with    Physical       Hammad NATASHA Diallo is a 63 year old male who presents for a complete physical exam.   HPI:   Patient with history of recent shingles on the scalp and postherpetic neuralgia, type 2 diabetes mellitus hypertension hyperlipidemia coronary artery disease, BPH is here for his annual physical  He is complaining of a lot of itching on the right temporal parietal scalp  The pain is 80% gone with pregabalin that he is taking twice a day  Patient has been using calamine lotion without any significant help    He is under the care of of [place] endocrinology for his diabetes and is currently taking Ozempic and metformin    History of coronary artery disease s/p angioplasty, under the care of [facility] a recent stress test in September 2023 was found to be normal  Currently taking Vascepa aspirin Lexapro and Crestor    Patient was seen by urology for elevated PSA, has had biopsies that were found to be negative  He does annual follow-ups, currently on finasteride  Denies any urinary issues at this time    Patient is also under the care of dermatology for the rash on the foot, in the fourth webspace of the left foot  He has tried several treatments without any significant help, the webspace is itchy      Wt Readings from Last 4 Encounters:   03/09/24 134 lb (60.8 kg)   01/30/24 133 lb (60.3 kg)   07/29/20 140 lb (63.5 kg)   08/05/19 142 lb (64.4 kg)     Body mass index is 23 kg/m².   BP Readings from Last 3 Encounters:   03/09/24 108/54   02/07/24 102/58   01/30/24 114/62      Current Outpatient Medications   Medication Sig Dispense Refill    pregabalin (LYRICA) 25 MG Oral Cap Take 1 capsule (25 mg total) by mouth 2 (two) times daily. 60 capsule 0    OZEMPIC, 0.25 OR 0.5 MG/DOSE, 2 MG/3ML  Subcutaneous Solution Pen-injector Inject 0.5 mg into the skin once a week.      rosuvastatin 10 MG Oral Tab Take 1 tablet (10 mg total) by mouth nightly.      finasteride 5 MG Oral Tab Take 1 tablet (5 mg total) by mouth daily.      VASCEPA 1 g Oral Cap Vascepa 1 gram capsule, [RxNorm: 0787396]      Lidocaine 0.5 % External Gel Apply 1 Application topically in the morning and 1 Application before bedtime. 170 g 0    Pantoprazole Sodium 40 MG Oral Tab EC Take 1 tablet (40 mg total) by mouth daily.      Losartan Potassium 25 MG Oral Tab Take 1 tablet (25 mg total) by mouth nightly. 30 tablet 6    Metoprolol Succinate ER 50 MG Oral Tablet 24 Hr Take 1 tablet (50 mg total) by mouth Daily Beta Blocker. 30 tablet 6    MetFORMIN HCl 1000 MG Oral Tab Take 1 tablet (1,000 mg total) by mouth daily with breakfast.      Aspirin 81 MG Oral Tab 1 TABLET DAILY        Past Medical History:   Diagnosis Date    Diabetes (HCC)     Unspecified essential hypertension     Visual impairment     retinal detachment left eye       Past Surgical History:   Procedure Laterality Date    BIOPSY/REMOVAL, LYMPH NODE(S) Left     COLONOSCOPY      EYE SURGERY      retinal detachment left eye     OTHER SURGICAL HISTORY      lymph node removal      Family History   Problem Relation Age of Onset    Diabetes Father     Heart Disorder Father     Diabetes Mother     Heart Disorder Mother     Other (Other) Brother       Social History:  Social History     Socioeconomic History    Marital status:    Tobacco Use    Smoking status: Former     Packs/day: 0.50     Years: 5.00     Additional pack years: 0.00     Total pack years: 2.50     Types: Cigarettes     Quit date:      Years since quittin.2    Smokeless tobacco: Never   Substance and Sexual Activity    Alcohol use: No    Drug use: No      Exercise: none.  Diet: doesn't watch     REVIEW OF SYSTEMS:   GENERAL HEALTH: feels well otherwise, denies fever  SKIN: denies any unusual skin  lesions or rashes  EYES: no visual complaints or deficits  HEENT: denies nasal congestion, sinus pain or sore throat; hearing loss negative  RESPIRATORY: denies shortness of breath, wheezing or cough  CARDIOVASCULAR: denies chest pain or NAPIER; no palpitations  GI: denies nausea, vomiting, constipation, diarrhea; no rectal bleeding; no heartburn  :  (Male):denies nocturia or changes in stream  MUSCULOSKELETAL: no joint complaints upper or lower extremities  NEURO: no sensory or motor complaint  PSYCHE: no symptoms of depression or anxiety  HEMATOLOGY: denies h/o anemia; denies bruising or excessive bleeding  ENDOCRINE: denies excessive thirst or urination; denies unexpected wt gain or wt loss  ALLERGY/IMM.: denies food or seasonal allergies    EXAM:   /54   Pulse 63   Temp 97.1 °F (36.2 °C) (Temporal)   Resp 16   Ht 5' 4\" (1.626 m)   Wt 134 lb (60.8 kg)   SpO2 98%   BMI 23.00 kg/m²  Body mass index is 23 kg/m².   GENERAL: well developed, well nourished,in no apparent distress  SKIN: no rashes,no suspicious lesions  HEENT: atraumatic, normocephalic,ears and throat are clear  EYES:PERRLA, EOMI,conjunctiva are clear  NECK: supple,no adenopathy,no bruits  CHEST: no chest tenderness  LUNGS: clear to auscultation  CARDIO: RRR without murmur  GI: good BS's,no masses, HSM or tenderness  : Deferred  RECTAL:Deferred  MUSCULOSKELETAL: back is not tender,FROM of the back  EXTREMITIES: no cyanosis, clubbing or edema  Bilateral barefoot skin diabetic exam is normal, visualized feet and the appearance is normal.  Bilateral monofilament/sensation of both feet is normal.  Pulsation pedal pulse exam of both lower legs/feet is normal as well.   Left foot-there is a scaly rash in the f fourth webspace  NEURO: Oriented times three,cranial nerves are intact,motor and sensory are grossly intact    ASSESSMENT AND PLAN:   :Hammad was seen today for physical.    Diagnoses and all orders for this visit:    Encounter for annual  physical exam  Hammad Diallo is a 63 year old male who presents for a complete physical exam.   Pt's weight is Body mass index is 23 kg/m².,   Eat a heart-healthy diet. Include potassium and fiber, and drink plenty of water.   Exercise regularly --- Dietary measures discussed include diet about 1800 calories - Excercise regimen also reviewed as well as long term benefits on overall health.   Recommend at least 30 minutes a day 3-4 times a week of aerobic activity such as brisk walking, cycling, aerobics, or swimming.  Anerobic activities also encouraged for overall toning and strength/endurance building  Fasting labs ordered   immunizations reviewed-Tdap and Prevnar 20 administered  Will administer Shingrix after 6 months  Depression screen completed  Diet and exercise counseling given         CBC With Differential With Platelet  -     Comp Metabolic Panel (14)  -     Lipid Panel  -     TSH and Free T4  -     PSA Total, Diagnostic    Type 2 diabetes mellitus with hyperglycemia, without long-term current use of insulin (HCC)  Check sugars  Will get his hemoglobin A1c  Continue Ozempic and metformin-     Hemoglobin A1C  -     Microalb/Creat Ratio, Random Urine    Fungal dermatitis  Patient has used several antifungals as prescribed by dermatology unfortunately nothing has helped and continues to scratch, patient will send me a message through [employer] of what medications he has tried  Postherpetic neuralgia  Continue pregabalin  In my opinion his itching on the scalp is probably related to the postherpetic neuralgia  Coronary artery disease involving native coronary artery of native heart without angina pectoris  Under the care of [facility]      Other orders  -     Prevnar 20 (PCV20) [87517]  -     TdaP (Adacel, Boostrix) [14426]          The patient indicates understanding of these issues and agrees to the plan.        No follow-ups on file.        Derek Camacho MD, 3/9/2024, 5:20 PM      This dictation was  performed with a verbal recognition program (DRAGON) and it was checked for errors. It is possible that there are still dictated errors within this office note. If so, please bring any errors to my attention for an addendum. All efforts were made to ensure that this office note is accurate

## 2024-03-12 DIAGNOSIS — B02.9 HERPES ZOSTER WITHOUT COMPLICATION: ICD-10-CM

## 2024-03-13 RX ORDER — PREGABALIN 25 MG/1
25 CAPSULE ORAL 2 TIMES DAILY
Qty: 60 CAPSULE | Refills: 0 | Status: SHIPPED | OUTPATIENT
Start: 2024-03-13

## 2024-03-13 NOTE — TELEPHONE ENCOUNTER
Patient comment: I ran out of this medication.  Please send the refills ASAP.  Thank you & appreciated!     Controlled Substance Medication Jcitkg0003/12/2024 10:31 PM    This medication is a controlled substance - forward to provider to refill   Neurology Medications Passed   Protocol Details In person appointment or virtual visit in the past 6 mos or appointment in next 3 mos          LOV 3/9/2024    No future appointments.

## 2024-03-31 DIAGNOSIS — B02.9 HERPES ZOSTER WITHOUT COMPLICATION: ICD-10-CM

## 2024-04-01 RX ORDER — PREGABALIN 25 MG/1
25 CAPSULE ORAL 2 TIMES DAILY
Qty: 60 CAPSULE | Refills: 0 | Status: SHIPPED | OUTPATIENT
Start: 2024-04-01

## 2024-04-01 NOTE — TELEPHONE ENCOUNTER
LOV 3/9/2024    No future appointments.    Medication Quantity Refills Start End   pregabalin (LYRICA) 25 MG Oral Cap 60 capsule 0 3/13/2024 --   Sig:   Take 1 capsule (25 mg total) by mouth 2 (two) times daily.     Route:   Oral

## 2024-05-13 ENCOUNTER — OFFICE VISIT (OUTPATIENT)
Dept: FAMILY MEDICINE CLINIC | Facility: CLINIC | Age: 64
End: 2024-05-13
Payer: COMMERCIAL

## 2024-05-13 VITALS
DIASTOLIC BLOOD PRESSURE: 70 MMHG | WEIGHT: 133.5 LBS | OXYGEN SATURATION: 98 % | RESPIRATION RATE: 16 BRPM | BODY MASS INDEX: 22.79 KG/M2 | SYSTOLIC BLOOD PRESSURE: 110 MMHG | TEMPERATURE: 98 F | HEART RATE: 70 BPM | HEIGHT: 64 IN

## 2024-05-13 DIAGNOSIS — Z12.5 SCREENING FOR PROSTATE CANCER: ICD-10-CM

## 2024-05-13 DIAGNOSIS — Z00.00 BLOOD TESTS FOR ROUTINE GENERAL PHYSICAL EXAMINATION: ICD-10-CM

## 2024-05-13 DIAGNOSIS — E55.9 VITAMIN D DEFICIENCY: ICD-10-CM

## 2024-05-13 DIAGNOSIS — B35.3 TINEA PEDIS OF LEFT FOOT: ICD-10-CM

## 2024-05-13 DIAGNOSIS — K21.9 GASTROESOPHAGEAL REFLUX DISEASE, UNSPECIFIED WHETHER ESOPHAGITIS PRESENT: ICD-10-CM

## 2024-05-13 DIAGNOSIS — E11.65 TYPE 2 DIABETES MELLITUS WITH HYPERGLYCEMIA, WITHOUT LONG-TERM CURRENT USE OF INSULIN (HCC): Primary | ICD-10-CM

## 2024-05-13 DIAGNOSIS — G47.33 OSA (OBSTRUCTIVE SLEEP APNEA): ICD-10-CM

## 2024-05-13 RX ORDER — SEMAGLUTIDE 0.68 MG/ML
0.5 INJECTION, SOLUTION SUBCUTANEOUS WEEKLY
Qty: 1 EACH | Refills: 0 | Status: SHIPPED | OUTPATIENT
Start: 2024-05-13

## 2024-05-13 RX ORDER — PANTOPRAZOLE SODIUM 40 MG/1
40 TABLET, DELAYED RELEASE ORAL DAILY
Qty: 90 TABLET | Refills: 1 | Status: SHIPPED | OUTPATIENT
Start: 2024-05-13

## 2024-05-13 NOTE — PROGRESS NOTES
/70   Pulse 70   Temp 97.5 °F (36.4 °C) (Temporal)   Resp 16   Ht 5' 4\" (1.626 m)   Wt 133 lb 8 oz (60.6 kg)   SpO2 98%   BMI 22.92 kg/m²               Chief Complaint   Patient presents with    Diabetes        HPI;    Hammad Diallo is a 64 year old male.  With history of type 2 diabetes mellitus, hyperlipidemia, BPH, obstructive sleep apnea who is here today for follow-up  Patient was supposed to get his blood test done and follow-up unfortunately he had partial blood work done by his insurance  His PSA, TSH was not checked, CBC CMP and lipids were done  That were within the normal limits  His A1c was 6.2  Patient is currently taking Ozempic and metformin  Patient is currently seeing an endocrinologist for his diabetes and wants me to take over the care, his diabetes is well-controlled and he has been going to [place]  Patient needs refills of his medications    Patient is recovering from postherpetic neuralgia and is currently taking Lyrica that has almost resolved the pain, there is no recurrence of pain in his neck or the shoulder    In addition patient was diagnosed tenia pedis on the left foot and the fifth webspace and was started on clotrimazole/triamcinolone cream unfortunately it has not helped      History of obstructive sleep apnea  He states that his CPAP machine is worn out, it is more than 10 years old    Wt Readings from Last 3 Encounters:   05/13/24 133 lb 8 oz (60.6 kg)   03/09/24 134 lb (60.8 kg)   01/30/24 133 lb (60.3 kg)     BP Readings from Last 3 Encounters:   05/13/24 110/70   03/09/24 108/54   02/07/24 102/58         ALLERGIES:  Allergies   Allergen Reactions    Dust Mite Extract OTHER (SEE COMMENTS)    Food ITCHING    Walnuts ITCHING     Current Outpatient Medications   Medication Sig Dispense Refill    OZEMPIC, 0.25 OR 0.5 MG/DOSE, 2 MG/3ML Subcutaneous Solution Pen-injector Inject 0.5 mg into the skin once a week. 1 each 0    pantoprazole 40 MG Oral Tab EC Take 1 tablet (40 mg  total) by mouth daily. 90 tablet 1    metFORMIN HCl 1000 MG Oral Tab Take 1 tablet (1,000 mg total) by mouth 2 (two) times daily with meals. 180 tablet 2    rosuvastatin 10 MG Oral Tab Take 1 tablet (10 mg total) by mouth nightly.      finasteride 5 MG Oral Tab Take 1 tablet (5 mg total) by mouth daily.      VASCEPA 1 g Oral Cap Vascepa 1 gram capsule, [RxNorm: [id number]]      Losartan Potassium 25 MG Oral Tab Take 1 tablet (25 mg total) by mouth nightly. 30 tablet 6    Metoprolol Succinate ER 50 MG Oral Tablet 24 Hr Take 1 tablet (50 mg total) by mouth Daily Beta Blocker. 30 tablet 6    Aspirin 81 MG Oral Tab 1 TABLET DAILY      clotrimazole-betamethasone 1-0.05 % External Lotion Apply 1 Application topically 2 (two) times daily. (Patient not taking: Reported on 2024) 30 mL 2    Lidocaine 0.5 % External Gel Apply 1 Application topically in the morning and 1 Application before bedtime. (Patient not taking: Reported on 2024) 170 g 0      Past Medical History:    Diabetes (HCC)    Unspecified essential hypertension    Visual impairment    retinal detachment left eye       Social History:  Social History     Socioeconomic History    Marital status:    Tobacco Use    Smoking status: Former     Current packs/day: 0.00     Average packs/day: 0.5 packs/day for 5.0 years (2.5 ttl pk-yrs)     Types: Cigarettes     Start date:      Quit date:      Years since quittin.3     Passive exposure: Never    Smokeless tobacco: Never   Vaping Use    Vaping status: Never Used   Substance and Sexual Activity    Alcohol use: No    Drug use: No        REVIEW OF SYSTEMS:   GENERAL HEALTH: feels well otherwise  SKIN: denies any unusual skin lesions or rashes  RESPIRATORY: denies shortness of breath with exertion  CARDIOVASCULAR: denies chest pain on exertion  GI: denies abdominal pain and denies heartburn  NEURO: denies headaches  EXAM:   /70   Pulse 70   Temp 97.5 °F (36.4 °C) (Temporal)   Resp 16   Ht  5' 4\" (1.626 m)   Wt 133 lb 8 oz (60.6 kg)   SpO2 98%   BMI 22.92 kg/m²   GENERAL: well developed, well nourished,in no apparent distress  SKIN: Dry scaly rash in the fifth webspace of the left foot  HEENT: atraumatic, normocephalic,ears and throat are clear  NECK: supple,no adenopathy,no bruits  LUNGS: clear to auscultation  CARDIO: RRR without murmur  GI: good BS's,no masses, HSM or tenderness  EXTREMITIES: no cyanosis, clubbing or edema    ASSESSMENT AND PLAN:   Diagnoses and all orders for this visit:    Type 2 diabetes mellitus with hyperglycemia, without long-term current use of insulin (HCC)  I had a long discussion with the patient regarding management of his diabetes  Advised him to continue the current medication Ozempic and metformin  Refills given  We will check his urine for microalbumin  -     Microalb/Creat Ratio, Random Urine  -     OZEMPIC, 0.25 OR 0.5 MG/DOSE, 2 MG/3ML Subcutaneous Solution Pen-injector; Inject 0.5 mg into the skin once a week.  -     metFORMIN HCl 1000 MG Oral Tab; Take 1 tablet (1,000 mg total) by mouth 2 (two) times daily with meals.    Vitamin D deficiency  Check vitamin D levels-     VITAMIN D, 1,25 DIHYDROXY [68467][Q]    Tinea pedis of left foot  I am sending the patient to podiatry  In my opinion the rash needs to be biopsied and it may not be tenia pedis  Several antifungals tried by his previous physicians and dermatologist as well as clotrimazole has not worked  Patient will call and make an appointment to see Dr. Ha  -     Podiatry Referral - In Network    LUCAS (obstructive sleep apnea)  Repeat sleep study     Diagnostic Sleep Study-split night PAP implemented if criteria met  -     General sleep study; Future    Gastroesophageal reflux disease, unspecified whether esophagitis present  Refills given-     pantoprazole 40 MG Oral Tab EC; Take 1 tablet (40 mg total) by mouth daily.    Blood tests for routine general physical examination  -Check TSH and PSA    TSH and  Free T4    Screening for prostate cancer  -     PSA - Total [5363][Q]    Follow-up in 3 months    The patient indicates understanding of these issues and agrees to the plan.  Imaging & Consults:  PODIATRY - INTERNAL  OP REFERRAL TO DIAGNOSTIC SLEEP STUDY  Meds & Refills for this Visit:  Requested Prescriptions     Signed Prescriptions Disp Refills    OZEMPIC, 0.25 OR 0.5 MG/DOSE, 2 MG/3ML Subcutaneous Solution Pen-injector 1 each 0     Sig: Inject 0.5 mg into the skin once a week.    pantoprazole 40 MG Oral Tab EC 90 tablet 1     Sig: Take 1 tablet (40 mg total) by mouth daily.    metFORMIN HCl 1000 MG Oral Tab 180 tablet 2     Sig: Take 1 tablet (1,000 mg total) by mouth 2 (two) times daily with meals.     Orders Placed This Encounter   Procedures    Microalb/Creat Ratio, Random Urine    TSH and Free T4    PSA - Total [5363][Q]    VITAMIN D, 1,25 DIHYDROXY [18874][Q]             Derek Camacho MD   [facility]  [address] [address]    Electronically signed    This dictation was performed with a verbal recognition program (DRAGON) and it was checked for errors. It is possible that there are still dictated errors within this office note. If so, please bring any errors to my attention for an addendum. All efforts were made to ensure that this office note is accurate

## 2024-05-22 ENCOUNTER — MED REC SCAN ONLY (OUTPATIENT)
Dept: FAMILY MEDICINE CLINIC | Facility: CLINIC | Age: 64
End: 2024-05-22

## 2024-05-22 RX ORDER — LANCETS 33 GAUGE
1 EACH MISCELLANEOUS DAILY
Qty: 100 EACH | Refills: 3 | Status: SHIPPED | OUTPATIENT
Start: 2024-05-22 | End: 2025-05-22

## 2024-05-22 RX ORDER — BLOOD SUGAR DIAGNOSTIC
STRIP MISCELLANEOUS
Qty: 100 STRIP | Refills: 3 | Status: SHIPPED | OUTPATIENT
Start: 2024-05-22 | End: 2025-05-22

## 2024-05-22 NOTE — TELEPHONE ENCOUNTER
Diabetic Supplies Protocol [id number]/22/2024 09:34 AM   Protocol Details In person appointment or virtual visit in the past 12 mos or appointment in next 3 mos     LOV 5/13/2024    No future appointments.

## 2024-06-20 LAB
CREATININE, RANDOM URINE: 93 MG/DL (ref 20–320)
MICROALBUMIN/CREATININE RATIO, RANDOM URINE: 25 MG/G CREAT
MICROALBUMIN: 2.3 MG/DL
PSA, TOTAL: 4.34 NG/ML
T4, FREE: 1.1 NG/DL (ref 0.8–1.8)
TSH: 3.96 MIU/L (ref 0.4–4.5)
VITAMIN D, 1,25 (OH)2,$TOTAL: 23 PG/ML (ref 18–72)
VITAMIN D2, 1,25 (OH)2: <8 PG/ML
VITAMIN D3, 1,25 (OH)2: 23 PG/ML

## 2024-06-21 DIAGNOSIS — R97.20 ABNORMAL PSA: Primary | ICD-10-CM

## 2024-08-01 ENCOUNTER — PATIENT MESSAGE (OUTPATIENT)
Dept: FAMILY MEDICINE CLINIC | Facility: CLINIC | Age: 64
End: 2024-08-01

## (undated) NOTE — LETTER
[facility] [address], [address]  Consent for Procedure/Sedation    Date: 12/04/2017     Time: 9:00am      1.  I authorize the performance upon Hammad Diallo the following:cardiac catheterization, left ventricular cineangiography, period, the physician will determine when the applicable recovery period ends for purposes of reinstating the Do Not Resuscitate (DNR) order.     Signature of Patient: ____________________________________________________    Signature of person authorized